# Patient Record
Sex: MALE | Race: WHITE
[De-identification: names, ages, dates, MRNs, and addresses within clinical notes are randomized per-mention and may not be internally consistent; named-entity substitution may affect disease eponyms.]

---

## 2018-01-09 ENCOUNTER — HOSPITAL ENCOUNTER (INPATIENT)
Dept: HOSPITAL 68 - ERH | Age: 68
LOS: 2 days | DRG: 177 | End: 2018-01-11
Attending: INTERNAL MEDICINE | Admitting: INTERNAL MEDICINE
Payer: COMMERCIAL

## 2018-01-09 VITALS — SYSTOLIC BLOOD PRESSURE: 132 MMHG | DIASTOLIC BLOOD PRESSURE: 84 MMHG

## 2018-01-09 VITALS — HEIGHT: 69 IN | BODY MASS INDEX: 20.14 KG/M2 | WEIGHT: 136 LBS

## 2018-01-09 DIAGNOSIS — R00.0: ICD-10-CM

## 2018-01-09 DIAGNOSIS — R13.10: ICD-10-CM

## 2018-01-09 DIAGNOSIS — J69.0: Primary | ICD-10-CM

## 2018-01-09 DIAGNOSIS — Z85.21: ICD-10-CM

## 2018-01-09 DIAGNOSIS — J96.01: ICD-10-CM

## 2018-01-09 DIAGNOSIS — I10: ICD-10-CM

## 2018-01-09 DIAGNOSIS — Z85.46: ICD-10-CM

## 2018-01-09 DIAGNOSIS — I25.10: ICD-10-CM

## 2018-01-09 LAB
ABSOLUTE GRANULOCYTE CT: 17.7 /CUMM (ref 1.4–6.5)
APTT BLD: 30 SEC (ref 25–37)
BASOPHILS # BLD: 0 /CUMM (ref 0–0.2)
BASOPHILS NFR BLD: 0 % (ref 0–2)
EOSINOPHIL # BLD: 0.4 /CUMM (ref 0–0.7)
EOSINOPHIL NFR BLD: 2.3 % (ref 0–5)
ERYTHROCYTE [DISTWIDTH] IN BLOOD BY AUTOMATED COUNT: 14.3 % (ref 11.5–14.5)
GRANULOCYTES NFR BLD: 93.4 % (ref 42.2–75.2)
HCT VFR BLD CALC: 40.2 % (ref 42–52)
LYMPHOCYTES # BLD: 0.4 /CUMM (ref 1.2–3.4)
MCH RBC QN AUTO: 27.2 PG (ref 27–31)
MCHC RBC AUTO-ENTMCNC: 32.1 G/DL (ref 33–37)
MCV RBC AUTO: 84.6 FL (ref 80–94)
MONOCYTES # BLD: 0.5 /CUMM (ref 0.1–0.6)
PLATELET # BLD: 339 /CUMM (ref 130–400)
PMV BLD AUTO: 7.9 FL (ref 7.4–10.4)
PROTHROMBIN TIME: 14.8 SEC (ref 9.4–12.5)
RED BLOOD CELL CT: 4.75 /CUMM (ref 4.7–6.1)
WBC # BLD AUTO: 19 /CUMM (ref 4.8–10.8)

## 2018-01-09 NOTE — ADMISSION CERTIFICATION
Admission Certification
 
Certification Statement
- As attending physician, I certify that at the time of
- admission, based on clinical presentation, severity of
- symptoms, need for further diagnostic testing and
- therapeutic interventions, and risk of adverse outcomes
- without in-hospital treatment, in my clinical assessment,
- this patient requires an acute hospital stay for a minimum
- of two nights or longer. I have also considered psychsocial
- factors such as support system, advanced age, financial
- issues, cognitive issues, and failed out-patient treatments,
- past re-admission history, safety of patient, and lack of
- compliance as applicable.
Specific rationale supporting this admission is:
Hospitalization is required for management of his aspiration pneumonia.  He will
require intravenous antibiotics

## 2018-01-09 NOTE — HISTORY & PHYSICAL
**See Addendum**
General Information and HPI
MD Statement:
I have seen and personally examined BOSWORTH,RAYMOND and documented this H&P.
 
The patient is a 67 year old M who presented with a patient stated chief 
complaint of [hypoxia].
 
Source of Information: patient, family
Exam Limitations: no limitations
History of Present Illness:
Mr. Bosworth is 67 year old male with past medical history significant for  
labile blood pressure, coronary artery disease with history of coronary stenosis
and right carotid stenosis, prostate cancer status post prostatectomy, throat 
and tongue cancer status post radiation and chemotherapy 2010 complicated by 
esophageal dysfunction had multiple esophageal dilatation procedure (Dr. Ramos (GI) 3 esophageal dilatations) last one July 2017 who presented to ED 
with chief complaint of hypoxia.  Patient was following with his primary care 
physician today for chronic cough when was found to have oxygen saturation in 
80s for the first time.
She reported that since 2010 he has been having multiple aspiration episodes and
dysphagia that has been getting worse in severity and frequency, in November 2017 he developed a productive cough of clear sputum that didn't improve with 
outpatient management, today patient went to his PCP for evaluation and was 
found to have hypoxia.
Patient denied any fever, chills, shortness of breath, chest pain, palpitation, 
history of nausea or vomiting, abdominal pain, diarrhea.  He does follow with 
Dr. Ackerman (ENT) for swallowing who recommended swallow evaluation at [facility] 
however patient didn't have any speech therapy evaluation yet.
He has been having dysphagia with liquids and solids, continue to have regular 
diet with no restrictions.  Had intentional weight loss of 30 pounds over the 
last 6 months associated with generalized weakness.
No history of joint pain, muscle pain, headaches, dizziness, weakness or 
numbness.
 
 
Allergies/Medications
Allergies:
Coded Allergies:
NO KNOWN ALLERGIES (02/06/15)
 
Home Med list
Midodrine HCl 2.5 MG TABLET   1 TAB PO TID BP  (Reported)
Pantoprazole Sodium 40 MG TABLET.DR   1 TAB PO DAILY GERD  (Reported)
Pravastatin Sodium 40 MG TABLET   1 TAB PO QPM CHOLESTEROL  (Reported)
 
 
Past History
 
Travel History
Traveled to [place] past 21 day No
 
Medical History
Cardiovascular: CAD, hypertension
Cancer(s): prostate cancer, THROAT CANCER
 
Surgical History
Surgical History: prostatectomy, feeding tube (2010) Bilateral heel repair Left 
knee  arthroscopic surgery Rhinoplasty
 
Review of Systems
 
Review of Systems
Constitutional:
Reports: see HPI. 
 
Exam & Diagnostic Data
Last 24 Hrs of Vital Signs/I&O
 Vital Signs
 
 
Date Time Temp Pulse Resp B/P B/P Pulse O2 O2 Flow FiO2
 
     Mean Ox Delivery Rate 
 
01/09 1500 98.0 92 18 130/68  94 Nasal 2.0L 
 
       Cannula  
 
01/09 1148    170/82     
 
01/09 1147 97.8 99 18 171/97  95 Nasal 2.0L 
 
       Cannula  
 
01/09 0955      87 Room Air  
 
01/09 0906 97.5 124 18 161/95  87 Room Air  
 
 
 Intake & Output
 
 
 01/09 1600 01/09 0800 01/09 0000
 
Intake Total 0  
 
Output Total   
 
Balance 0  
 
    
 
Intake, Oral 0  
 
Patient 61.689 kg  
 
Weight   
 
Weight Estimated  
 
Measurement   
 
Method   
 
 
 
 
Physical Exam
General Appearance Alert, Oriented X3, Cooperative, No Acute Distress
Skin No Rashes
Skin Temp/Moisture Exam: Warm/Dry
HEENT Atraumatic, PERRLA, EOMI, Mucous Membr. moist/pink
Neck Supple
Lymphatic No cervical lymphadenopathy 
Cardiovascular Regular Rate, Normal S1, Normal S2, No Murmurs
Lungs Clear to Auscultation, Normal Air Movement, Transmitted sounds 
Abdomen Normal Bowel Sounds, Soft, No Tenderness
Neurological Normal Gait, Normal Speech, Strength at 5/5 X4 Ext, Normal Tone, 
Sensation Intact, Cranial Nerves 3-12 NL, Reflexes 2+
Extremities No Clubbing, No Cyanosis, No Edema, Normal Pulses, No Tenderness/
Swelling
 
Assessment/Plan
Assessment:
Mr. Bosworth is 67 year old male with past medical history significant for  
labile blood pressure, coronary artery disease with history of coronary stenosis
and right carotid stenosis, prostate cancer status post prostatectomy, throat 
and tongue cancer status post radiation and chemotherapy 2010 complicated by 
esophageal dysfunction had multiple esophageal dilatation procedure (Dr. Ramos (GI) 3 esophageal dilatations) last one July 2017 who presented to ED 
with chief complaint of hypoxia.  
 
On admission
Vital signs temperature 97.5, heart rate 124, blood pressure 161/95, respiratory
18 and saturating 87% on room air, 95% on 2 L
Labs significant for leukocytosis with 11 bands and left shift, H&H 12.9/40.2, 
platelet 339, sodium 142, potassium 4.6, chloride 100, bicarbonate 31, BUN 26 
and creatinine 1.1, lactic acid 1.9, troponin less than 0.01, albumin 3.1
 
CTA 
1. No pulmonary embolism.
2. Findings are consistent with infectious bronchiolitis, bronchopneumonia
and associated hilar and mediastinal lymphadenopathy. Query whether patient
is clinically at risk for aspiration.
3. Trace right pleural effusion.
 
EKG sinus tachycardia, heart rate 122, T-wave inversion in V2
 
Modified barium swallow in May 2017
IMPRESSION:
 
1. Pooling of contrast within the valleculae with puree, nectar and bread
consistencies.
2. Penetration of contrast seen with thin liquids and with honey consistency
without josh aspiration. Given this finding, it was not felt to be safe to
perform a barium swallow as requested on the requisition.
 
X-ray barium swallow was for angiogram on 11/30/15
IMPRESSION:
 
1. Josh aspiration into the proximal trachea observed on a single occasion
upon successive swallowing of thin liquids.
2. Mild cricopharyngeal hypertrophy, causing esophageal luminal narrowing,
preventing passage of a barium tablet.
3. Ingested barium tablet temporarily also lodges in the vallecula.
4. No significant esophageal dysmotility.
 
 
Problem list
#Aspiration pneumonia/pneumonitis in sitting of chronic dysphagia as a result of
dysphagia stricture
#Acute hypoxic respiratory failure
#Sinus tachycardia could be stress induced versus hypoxia
#Hypertension
#History of coronary artery disease
 
Plan
-Admit to telemetry floor given hypoxia, sinus tachycardia and labile blood 
pressure
-Continue oxygen supplementation and TRC
-Start Unasyn 3 g every 6 IV
-Keep patient nothing by mouth pending swallow evaluation
-Obtain Pulmonary consultation
-Vital signs every shift
-Panculture
-Protonix 40 daily
-Robitussin with codeine every afternoon
-Continue home medication admitted drawn 2.5 mg 3 times a day and pravastatin 40
mg daily
-DVT prophylaxis Lovenox
-Diet nothing by mouth pending swallow eval
-Code full code
 
As Ranked By This Provider
Problem List:
 1. Pneumonia
 
 
Core Measures/Misc (9/17)
 
Acute Coronary Syndrome
ACS Diagnosis: No
 
Congestive Heart Failure
Congestive Heart Failure Diagnosis No
 
Cerebrovascular Accident
CVA/TIA Diagnosis: No
 
VTE (View Protocol)
VTE Risk Factors Age>40
No Mechanical VTE Prophylaxis d/t N/A MechProphylax Ordered
No VTE Pharm Prophylaxis d/t NA PharmProphylax ordered
 
Sepsis (View protocol)
Sepsis Present: No

## 2018-01-09 NOTE — CT SCAN REPORT
EXAMINATION:
CT ANGIOGRAM OF THE CHEST WITH CONTRAST (CT PULMONARY ANGIOGRAM FOR PE)
 
CLINICAL INFORMATION:
Intermittent hypoxia and tachycardia. Evaluate for pulmonary embolism.
 
COMPARISON:
Chest CT from 04/15/2015. CXR from 11/16/2017.
 
TECHNIQUE:
Prior to contrast administration, noncontrast localization images were
obtained. Subsequently, multidetector volumetric imaging was performed from
the thoracic inlet to below the diaphragms following the administration of 95
mL Optiray 350 intravenous contrast. No contrast reaction reported. Sagittal,
coronal, and MIP oblique sagittal reformatted images were obtained on the CT
workstation, uploaded to PACS, and reviewed.
 
Total exam dose-length product 255 mGy-cm
 
FINDINGS:
 
QUALITY OF STUDY/CONTRAST BOLUS: Satisfactory.
 
PULMONARY ARTERIES: Pulmonary arteries are normal in caliber. No embolic
filling defects within the main, lobar or segmental vessels.
 
THORACIC AORTA: There is mild atherosclerotic calcification of the thoracic
aorta without aneurysm or dissection.
 
LUNGS AND PLEURA: Trachea and mainstem bronchi are widely patent and normal
in caliber. There are scattered endobronchial secretions within lower lobe
bronchi. There are numerable tree-in-bud nodules in both multiple lobes of
both lungs, most severely affecting the lower lobes, associated with
scattered patchy ground glass opacities. There are scattered regions of
consolidation within the right middle lobe, inferior lingula and lower lobes.
A 0.7 cm cystic space or pneumatocele is present within the right upper lobe
(image 167, series 2). Trace right pleural effusion. No pneumothorax.
 
MEDIASTINUM: The heart size is normal. Mild atherosclerotic calcification of
left anterior descending coronary artery. No evidence of septal bowing or
right heart strain. The esophagus is unremarkable. The visualized portion of
the thyroid gland is normal.
 
LYMPHATICS: No axillary lymphadenopathy. The right and left hilar lymph nodes
measure up to 1.1 cm short axis dimension. Largest lymph nodes within the
prevascular space of the mediastinum measure up to 0.9 cm short axis
dimension. Subcarinal lymphadenopathy is present and the largest lymph node
is 2.3 cm AP.
 
UPPER ABDOMEN: Unremarkable.  No reflux of contrast into the hepatic veins to
suggest elevated right heart pressures.
 
OSSEOUS STRUCTURES: Old fractures of left lateral sixth and seventh ribs.
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 No suspicious
osseous lesions.
 
IMPRESSION:
 
1. No pulmonary embolism.
2. Findings are consistent with infectious bronchiolitis, bronchopneumonia
and associated hilar and mediastinal lymphadenopathy. Query whether patient
is clinically at risk for aspiration.
3. Trace right pleural effusion.

## 2018-01-09 NOTE — ED GENERAL ADULT
History of Present Illness
 
General
Chief Complaint: General Adult
Stated Complaint: LOW 02 -SOB
Source: patient, family
Exam Limitations: no limitations
Allergies
Coded Allergies:
NO KNOWN ALLERGIES (02/06/15)
 
Reconcile Medications
Amoxicillin/Potassium Clav (Augmentin 875-125 Tablet) 875 MG-125 MG TABLET   1 
TAB PO BID PNEUMONIA 
     .
Hydralazine HCl 25 MG TABLET   1 TAB PO TID HIGH BLOOD PRESSURE
     .
Metoprolol Tartrate 25 MG TABLET   1 TAB PO BID HIGH BLOOD PRESSURE
     .
Midodrine HCl 2.5 MG TABLET   1 TAB PO TID BP  (Reported)
Pantoprazole Sodium 40 MG TABLET.DR   1 TAB PO DAILY GERD  (Reported)
Pravastatin Sodium 40 MG TABLET   1 TAB PO QPM CHOLESTEROL  (Reported)
 
Triage Note:
PT TO ER SENT IN BY DR. LEDBETTER FOR EVAL OF
 HYPOXIA. PT HAS 2 YEAR HX OF COUGH/SOB, WORSENING
 X 3 WEEKS. WAS DX WITH BRONCHITIS, GIVEN ABX WITH
 RELIEF. HOWEVER, SYMPTOMS CONTINUE. PT HAS HX OF
 THROAT CA, PER PT, HAS BEEN TOLD HE IS ASPIRATING
 WHEN COUGHING. TRYING TO GET APPT WITH [facility] SPEECH
 AND SWALLOW PATHOLOGIST.
Triage Nurses Notes Reviewed? yes
Duration: constant
HPI:
Mr Bosworth is a 67-year-old gentleman with a PMH of labile BP currently on 
midodrine 2.5 mg TID with plans to follow-up with his cardiologist (Dr. Dukes) 
at [facility] on 1/10/18 to start additional BP lowering agent, CAD on pravastatin 40 
mg and aspirin 81 mg daily, prostate CA s/p prostatectomy, throat/tongue cancer 
diagnosed 2010 s/p radiation and chemotherapy, complicated with esophageal 
dysfunction for which he has been followed up with by Dr. Ackerman (ENT) and Dr. Ramos (GI) with 3 esophageal dilatations, most recently as July 2017 with 
findings for esophageal web and proximal esophageal narrowing.
 
Unfortunately due to these complications he has long-standing dysphagia to 
solids and liquids, chronic cough which over the past 9 months has become 
productive with clear sputum, approximately 30 pound weight loss and generalized
weakness over the past 6 months.
 
He was diagnosed with bronchitis in November 2017, started on a Z-Arash with no 
improvement in symptoms, followed up with his PCP and subsequently diagnosed 
with aspiration pneumonia, completed a ten-day course of ciprofloxacin 500 mg 
BID + metronidazole 500 mg TID. He did experience significant improvement in his
symptoms (decreased cough, increased energy) for a few days after completing 
this antibiotics.
 
He was referred to the ED for evaluation after his saturations were noted to be 
in the 80's at the PCPs office today. 
 
He denies any headaches, blurry vision, night sweats, chills, body aches, 
abdominal pain, diarrhea, changes in urinary function, palpitations, lower 
extremity swelling.
 
Of note, according to the patient/his wife, previous antihypertensive 
medications including amlodipine, Bystolic and Edarbi were discontinued in the 
context of labile blood pressure/autonomic dysregulation with subsequent 
hypotension and he is scheduled to follow-up with his cardiologist Dr. Miller on 
1/10/18. 
(Krystian Campos MD)
 
Vital Signs & Intake/Output
Vital Signs & Intake/Output
 Vital Signs
 
 
Date Time Temp Pulse Resp B/P B/P Pulse O2 O2 Flow FiO2
 
     Mean Ox Delivery Rate 
 
01/09 1148    170/82     
 
01/09 1147 97.8 99 18 171/97  95 Nasal 2.0L 
 
       Cannula  
 
01/09 0955      87 Room Air  
 
01/09 0906 97.5 124 18 161/95  87 Room Air  
 
 
 
(Ham DOWNING,Twyla)
 
Past History
 
Travel History
Traveled to [place] past 21 day No
 
Medical History
Any Pertinent Medical History? see below for history
Cardiovascular: CAD, hypertension
Cancer(s): prostate cancer, THROAT CANCER
 
Surgical History
Surgical History: prostatectomy, feeding tube (2010) Bilateral heel repair Left 
knee  arthroscopic surgery Rhinoplasty
 
Psychosocial History
What is your primary language English
Tobacco Use: Quit >30 days ago
 
Family History
Hx Contributory? No
(Krystian Campos MD)
 
Review of Systems
 
Review of Systems
Constitutional:
Reports: see HPI. 
EENTM:
Reports: see HPI. 
Respiratory:
Reports: see HPI. 
Cardiovascular:
Reports: no symptoms. 
GI:
Reports: no symptoms. 
Genitourinary:
Reports: no symptoms. 
Musculoskeletal:
Reports: see HPI. 
Skin:
Reports: see HPI. 
Neurological/Psychological:
Reports: see HPI. 
(Krystian Campos MD)
 
Physical Exam
 
Physical Exam
General Appearance: cachectic appearing, chronic productive cough
Head: normal appearance
Eyes:
Bilateral: EOMI. 
Ears, Nose, Throat: mucous membranes are dry.
Neck: cachectic appearing. Evidence of skin changes on the right side of the 
neck
Respiratory: SLight expiratory rhonchi present 
Cardiovascular: regular rate/rhythm, Tachycardic
Gastrointestinal: normal bowel sounds, soft, non-tender
Extremities: normal inspection, normal range of motion, no edema
Neurologic/Psych: awake, alert
 
Core Measures
ACS in differential dx? No
CVA/TIA Diagnosis: No
Sepsis Present: No
Sepsis Focused Exam Completed? No
(Andres DOWNING,Krystian)
 
Progress
Differential Diagnoses
I considered the following diagnoses in my evaluation of the patient: 
 
Initial ED EKG: none
(Andres DOWNING,Krystian)
Plan of Care:
 Orders
 
 
Procedure Date/time Status
 
CBC WITHOUT DIFFERENTIAL 01/10 0600 Active
 
BASIC ELECTROLYTES PLUS BUN&CR 01/10 0600 Active
 
Nothing by Mouth 01/09 D Active
 
SWALLOW EVALUATION 01/09 1415 Active
 
TRC EVALUATION (GEN) 01/09 1415 Active
 
Pathway - chart 01/09 1415 Active
 
House Staff 01/09 1415 Active
 
Patient Data 01/09 1415 Active
 
Code Status 01/09 1415 Active
 
Patient Data 01/09 1214 Active
 
ED Holding Orders 01/09 1206 Active
 
Admit to inpatient 01/09 1206 Active
 
Vital Signs 01/09 1206 Active
 
Code Status 01/09 1206 Complete
 
TRC EVALUATION (GEN) 01/09 1122 Active
 
CULTURE,URINE 01/09 1122 Active
 
LOWER RESPIRATORY CULTURE 01/09 1122 Active
 
URINALYSIS 01/09 1122 Complete
 
Add-on Test (ER Only) 01/09 1042 Active
 
Add-on Test (ER Only) 01/09 0943 Active
 
D-DIMER 01/09 0937 Complete
 
Intake & Output 01/09 0931 Active
 
BLOOD CULTURE 01/09 0923 Active
 
TROPONIN LEVEL 01/09 0923 Complete
 
PARTIAL THROMBOPLASTIN TIME 01/09 0923 Complete
 
PROTHROMBIN TIME 01/09 0923 Complete
 
LACTIC ACID 01/09 0923 Complete
 
COMPREHENSIVE METABOLIC PANEL 01/09 0923 Complete
 
CBC WITHOUT DIFFERENTIAL 01/09 0923 Complete
 
EKG 01/09 0923 Active
 
VTE Mechanical Prophylaxis 01/09  UNK Active
 
Vital Signs 01/09  UNK Active
 
 
 Current Medications
 
 
  Sig/Nancy Start time  Last
 
Medication Dose  Stop Time Status Admin
 
Ceftriaxone Sodium 1,000 MG DAILY 01/10 1000 AC 
 
(Rocephin)     
 
Enoxaparin Sodium 40 MG DAILY 01/09 1415 AC 
 
(Lovenox)     
 
Acetaminophen 650 MG Q6P PRN 01/09 1400 AC 
 
(Tylenol)     
 
Azithromycin 500 MG DAILY 01/09 1224 AC 
 
(Zithromax)     
 
Dextrose/Water 250 ML    
 
(D5W)     
 
Sodium Chloride 1,000 ML Q20H 01/09 1045 AC 01/09
 
(Normal Saline 0.9%)   01/10 0644  1109
 
 
 Laboratory Tests
 
 
 
01/09/18 1352:
Urine Color YEL, Urine Clarity CLEAR, Urine pH 6.0, Ur Specific Gravity 1.015, 
Urine Protein TRACE  H, Urine Ketones NEG, Urine Nitrite NEG, Urine Bilirubin 
NEG, Urine Urobilinogen 0.2, Ur Leukocyte Esterase NEG, Ur Microscopic SEDIMENT 
EXAMINED, Urine RBC 1-3, Urine WBC 1-3  H, Ur Epithelial Cells RARE, Hyaline 
Casts 1-3  H, Urine Mucus FEW, Urine Hemoglobin NEG, Urine Glucose NEG
 
01/09/18 1223:
Lactic Acid Cancelled
 
01/09/18 0942:
D-Dimer High Sensitivty Cancelled
 
01/09/18 0937:
Anion Gap 11, Estimated GFR > 60, BUN/Creatinine Ratio 23.6, Glucose 146  H, 
Lactic Acid 1.9, Calcium 9.6, Total Bilirubin 1.1, AST 21, ALT 26, Alkaline 
Phosphatase 95, Troponin I < 0.01, Total Protein 6.7, Albumin 3.1  L, Globulin 
3.6, Albumin/Globulin Ratio 0.9  L, PT 14.8  H, INR 1.41  H, APTT 30, D-Dimer 
High Sensitivty 882  H, CBC w Diff MAN DIFF ORDERED, RBC 4.75, MCV 84.6, MCH 
27.2, RDW 14.3, MPV 7.9, Gran % 93.4  H, Lymphocytes % 1.9  L, Monocytes % 2.4, 
Eosinophils % 2.3, Basophils % 0, Absolute Granulocytes 17.7  H, Segmented 
Neutrophils 81  H, Band Neutrophils 11  H, Absolute Lymphocytes 0.4  L, 
Lymphocytes 4  L, Monocytes 2, Absolute Monocytes 0.5, Eosinophils 2, Absolute 
Eosinophils 0.4, Absolute Basophils 0, Platelet Estimate ADEQUATE, Normocytic 
RBCs VERIFIED, Normochromic RBCs VERIFIED, PUBS MCHC 32.1  L
 Microbiology
01/09 1352  URINE ROUT: Urine Culture - RECD
01/09 1122  LOWER RESP: Respiratory Culture - ORD
01/09 1122  LOWER RESP: Gram Stain - ORD
01/09 1012  BLOOD: Blood Culture - RECD
01/09 0945  BLOOD: Blood Culture - RECD
 
 
(Ham DOWNING,Twyla)
 
Departure
 
Departure
Time of Disposition: 1330
Condition: Stable
Clinical Impression
Primary Impression: Pneumonia
Referrals:
Hannah Ledbetter MD (PCP/Family)
 
Additional Instructions:
You're being admitted for treatment of bronchopneumonia/bronchiolitis that will 
require IV antibiotics.
You will also require close monitoring of your blood pressure while admitted.
 
Departure Forms:
Customer Survey
General Discharge Information
Prescriptions:
Current Visit Scripts
Amoxicillin/Potassium Clav (Augmentin 875-125 Tablet) 1 TAB PO BID  
     #15 TAB 
     .
 
Hydralazine HCl 1 TAB PO TID  
     #90 TAB 
     .
 
Metoprolol Tartrate 1 TAB PO BID  
     #60 TAB 
     .
 
 
 
Admission Note
Documentation of Exam:
Documentation of any treatments & extenuating circumstances including Concerns 
Regarding Discharge (functional status, medication knowledge or non-compliance, 
living conditions, etc.) that warrant an admission rather than observation: [
Bronchopneumonia/bronchiolitis, systemic inflammatory response syndrome].  
Patient will require supplemental oxygen, IV antibiotics, respiratory treatments
, possible pulmonology/infectious disease consults
 
(Krystian Campos MD)
 
Departure
Time of Disposition: 1206
Disposition: STILL A PATIENT
 
Admission Note
Spoke With:
Korey Oseguera MD
 
Resident Co-Sign Statement
Statement:
ED Attending supervision documentation-
 
[X] I saw and evaluated the patient. I have also reviewed all the pertinent lab 
results and diagnostic results. I agree with the findings and the plan of care 
as documented in the Resident's documentation. 
 
[X] I have reviewed the ED Record and agree with the Resident's documentation.
 
[] Additions or exceptions (if any) to the Resident's note and plan are 
summarized below:
[]
 
(Ham DOWNING,Twyla)
 
Critical Care Note
 
Critical Care Note
Critical Care Time: non-applicable
(Krystian Campos MD)

## 2018-01-10 VITALS — SYSTOLIC BLOOD PRESSURE: 168 MMHG | DIASTOLIC BLOOD PRESSURE: 88 MMHG

## 2018-01-10 VITALS — SYSTOLIC BLOOD PRESSURE: 190 MMHG | DIASTOLIC BLOOD PRESSURE: 102 MMHG

## 2018-01-10 VITALS — SYSTOLIC BLOOD PRESSURE: 120 MMHG | DIASTOLIC BLOOD PRESSURE: 78 MMHG

## 2018-01-10 VITALS — DIASTOLIC BLOOD PRESSURE: 80 MMHG | SYSTOLIC BLOOD PRESSURE: 142 MMHG

## 2018-01-10 VITALS — SYSTOLIC BLOOD PRESSURE: 172 MMHG | DIASTOLIC BLOOD PRESSURE: 88 MMHG

## 2018-01-10 LAB
ABSOLUTE GRANULOCYTE CT: 9.3 /CUMM (ref 1.4–6.5)
BASOPHILS # BLD: 0 /CUMM (ref 0–0.2)
BASOPHILS NFR BLD: 0.1 % (ref 0–2)
EOSINOPHIL # BLD: 1 /CUMM (ref 0–0.7)
EOSINOPHIL NFR BLD: 8.9 % (ref 0–5)
ERYTHROCYTE [DISTWIDTH] IN BLOOD BY AUTOMATED COUNT: 14.5 % (ref 11.5–14.5)
GRANULOCYTES NFR BLD: 82.6 % (ref 42.2–75.2)
HCT VFR BLD CALC: 35.6 % (ref 42–52)
LYMPHOCYTES # BLD: 0.5 /CUMM (ref 1.2–3.4)
MCH RBC QN AUTO: 27.7 PG (ref 27–31)
MCHC RBC AUTO-ENTMCNC: 32.8 G/DL (ref 33–37)
MCV RBC AUTO: 84.5 FL (ref 80–94)
MONOCYTES # BLD: 0.4 /CUMM (ref 0.1–0.6)
PLATELET # BLD: 287 /CUMM (ref 130–400)
PMV BLD AUTO: 8.8 FL (ref 7.4–10.4)
RED BLOOD CELL CT: 4.21 /CUMM (ref 4.7–6.1)
WBC # BLD AUTO: 11.2 /CUMM (ref 4.8–10.8)

## 2018-01-10 NOTE — ECHOCARDIOGRAM REPORT
BOSWORTH, RAYMOND 
 
 Age:    67     :    1950      Gender:     M 
 
 MRN:    [id number] 
 
 Exam Date:     01/10/2018  
                11:04 
 
 Exam Location: 1  
 [place] 
 
 Ht (in):     69      Wt (lb):      136     BSA:    1.73 
 
 BP:          172     /     88 
 
 Ordering Physician:        Anders Tom MD 
 
 Referring Physician:       Anders Tom MD 
 
 Technologist:              Patrick Velázquez [facility] 
 
 Room Number:               185-1 
 
 Indications:       Shortness of breath 
 
 Rhythm:                 Sinus 
 
 Technical Quality:      Good 
 
 FINDINGS 
 
 Left Ventricle 
 Normal left ventricular size, wall thickness and systolic function  
 with no obvious regional wall motion abnormalities.    The ejection  
 fraction is visually estimated at  >65   %.  Abnormal relaxation  
 filling pattern of the left ventricle for age (stage 1 diastolic  
 dysfunction). 
 
 Right Ventricle 
 The right ventricle is normal in size and function. 
 
 Right Atrium 
 The right atrium is normal in size. 
 
 Left Atrium 
 The left atrium is normal in size.  The interatrial septum is  
 intact. 
 
 Mitral Valve 
 The mitral valve is normal in structure and function.  There is no  
 mitral regurgitation. 
 
 Aortic Valve 
 Focal thickening of the aortic valve cusps. No aortic stenosis. No  
 aortic regurgitation. 
 
 Tricuspid Valve 
 The tricuspid valve is normal in structure and function.  There is  
 trace tricuspid regurgitation.  Unable to estimate the right  
 ventricular systolic pressure. 
 
 Pulmonic Valve 
 Structurally normal pulmonic valve.  There is no pulmonic  
 regurgitation. 
 
 Pericardium 
 Normal pericardium without effusion.  No pleural effusion. 
 
 Great Vessels 
 Normal aortic root dimension.  The aortic arch and great vessels are  
 well seen and are normal. 
 
 CONCLUSIONS 
 Normal left ventricular size, wall thickness and systolic function  
 with no obvious regional wall motion abnormalities. 
 The ejection fraction is visually estimated at  >65   %. 
 Abnormal relaxation filling pattern of the left ventricle for age  
 (stage 1 diastolic dysfunction). 
 The left atrium is normal in size. 
 The mitral valve is normal in structure and function. 
 Focal thickening of the aortic valve cusps. 
 No aortic stenosis. 
 No aortic regurgitation. 
 Unable to estimate the right ventricular systolic pressure. 
 
 Javier Ramos M.D. 
 (Electronically Signed) 
 Final Date:      10 January 2018  
                  13:11 
 
 MEASUREMENTS  (Male / Female) Normal Values 
 
 2D ECHO 
 LV Diastolic Diameter PLAX        4.3 cm                4.2 - 5.9 / 3.9 - 5.3 
cm 
 LV Systolic Diameter PLAX         2.5 cm                2.1 - 4.0 cm 
 LV Fractional Shortening PLAX     41.9 %                25 - 46  % 
 LV Ejection Fraction 2D Teich     73.1 %                 
 IVS Diastolic Thickness           1.1 cm                 
 LVPW Diastolic Thickness          1.0 cm                 
 LV Relative Wall Thickness        0.5                    
 LVOT Diameter                     2.0 cm                 
 Aortic Root Diameter              3.1 cm                 
 LA Systolic Diameter LX           3.5 cm                3.0 - 4.0 / 2.7 - 3.8 
cm 
 Ascending Aorta Diameter          2.9 cm                 
 
 DOPPLER 
 AV Peak Velocity                  130.0 cm/s             
 AV Peak Gradient                  6.8 mmHg               
 AV Mean Velocity                  92.0 cm/s              
 AV Mean Gradient                  4.0 mmHg               
 AV Velocity Time Integral         28.0 cm                
 LVOT Peak Velocity                98.5 cm/s              
 LVOT Peak Gradient                3.9 mmHg               
 LVOT Mean Velocity                58.4 cm/s              
 LVOT Mean Gradient                2.0 mmHg               
 LVOT Velocity Time Integral       22.0 cm                
 LVOT Stroke Volume                69.1 cm               
 AV Area Cont Eq vti               2.5 cm                
 AV Area Cont Eq pk                2.4 cm                
 MV Peak Velocity                  113.0 cm/s             
 MV Peak Gradient                  5.1 mmHg               
 MV Mean Velocity                  70.6 cm/s              
 MV Mean Gradient                  2.0 mmHg               
 Mitral E Point Velocity           63.7 cm/s              
 Mitral A Point Velocity           102.0 cm/s             
 Mitral E to A Ratio               0.6                    
 MV PHT Velocity                   84.2 cm/s              
 MV Deceleration Frio             569.0 cm/s            
 MV Pressure Half Time             44.4 ms                
 MV Area PHT                       5.0 cm                
 MV Deceleration Time              475.0 ms               
 PV Peak Velocity                  113.0 cm/s             
 PV Peak Gradient                  5.1 mmHg               
 PV Mean Velocity                  75.4 cm/s              
 PV Mean Gradient                  3.0 mmHg               
 PV Velocity Time Integral         22.5 cm                
 LV E' Lateral Velocity            10.6 cm/s              
 Mitral E to LV E' Lateral Ratio   6.0                    
 LV E' Septal Velocity             11.7 cm/s              
 Mitral E to LV E' Septal Ratio    5.4

## 2018-01-10 NOTE — CONS- GASTROENTEROLOGY
General Information and HPI
 
Consulting Request
Date of Consult: 01/10/18
Requested By:
Evelyne DOWNING,Priti
 
Reason for Consult:
I was called by the hospitalist service earlier today in GI coverage for Dr. RICARDO Ramos, to assess patient with known laryngeal/tongue Ca for aspiration & 
dysphagia. The GI consult was done in the presence of the patient's wife, Isabella, 
at the bedside, as per patient request.
Source of Information: patient, old records
Exam Limitations: no limitations
History of Present Illness:
68 y/o male, HTN (occasionally labile, requiring Midodrine), non-DM, HLD, ASHD 
with coronary stenosis & right carotud stenosis, prostate Ca post prostatectomy,
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 
dysfunction with multiple esophageal dilatations per Dr. ATUL Ramos, last done 
07/05/17, with dilatation of upper esophageal web. The patient claimed his 
swallowing issues did not improve a lot after his last dilitation. *The patient 
remotely had a PEG from 10/2010-04/2011. 03/26/13: Last PET- no gross 
recurrence. 10/08/15: Normal PFTs. 
 
The patient presented to the [facility] 01/09/18 at 8:59 p.m. with worsening of 
chronic cough and hypoxia, at the recommendation of his PMD, with O2 sat 80's 
RA. Upon arrival, /95, P 124, R 18, T 97.5, O2 sat 87%. His O2 sat later 
improved to 95% on 2L.
 
Since 2010, the patient had multiple aspiration episodes and dysphagia, becoming
more frequent and severe.  In 11/2017, he developed a productive cough of clear 
sputum that didn't improve with with outpatient management with antibiotics.  He
denied any fevers, chills, shortness of breath, chest pain, palpitation, nausea,
vomiting, odynophagia, hemetemesis, melena, early satiety, or abdominal pain.  
He saw Dr. Ackerman of ENT, who recommended a swallowing evaluation at [facility], 
which was not yet done.  The patient's most recent outpatient diet predominantly
consisted of oatmeal and yogurt.  He had dysphagia with liquids and solids, but 
tolerated pills.  He had unintentionally lost 30 pounds over the past 6 months 
PTA, since 06/2017, attributed to altering his diet so that he not have 
swallowing difficulty.  He denied any fevers, chills, rashes, hemoptysis, 
pleuritic pain, or jaundice.  As the sticking was mostly near the upper 
esophageal inlet by description, there was a component of transfer dysphagia.  
Apparently, a recent outpatient ENT evaluation failed to reveal any local tumor 
recurrence.  There is no known history of thyroid disease or CVA. There was no 
nasal regurgitation of food or liquids.  There was no change in his voice.  He 
denied any diarrhea, constipation, obstipation, tenesmus, or bleeding.  
 
He was rxd Robitussin, Omeprazole 40 mg daily, nebulizers, and IV Unasyn 3g Q6h,
with improvement of leukocytosis & O2 sats. *The patient stated he just 
tolerated ground chicken and broccoli for dinner uneventfully, although he still
had a cough.
 
*The patient has had multiple endoscopic procedures by Dr. ATUL Ramos.  He has a
history of colon adenoma and positive family history of colon polyps.  There is 
no family history of any additional GI or liver issues.
 
12/09/15: Last colonoscopy to [facility]- enlarged internal hemorrhoids, left-sided 
diverticula, 2 subcm polyps removed- both benign TVA, without invasive Ca.
 
07/05/17: EGD with esophageal dilitation for dysphagia, post head & neck Ca- 
normal hypopharynx, proximal esophageal web, with slight narrowing of the 
esophageal lumen, beyond which, the caliber, contour, and mucosa of the 
esophagus was normal, the junction of 40 cm, normal stomach, normal duodenum to 
the sweep. *The area of the esophageal inlet and proximal esophagus was dilated 
for 1 minute using a 19 mm CRE balloon, with disruption of the lab. 
 
01/09/17: Admission labs- WBC 19 (81S/11B/4L/2M/2E), H/H 12.9/40.2, MCV 84.6, 
RDW 14.3, , PT 14.8, INR 1.41, PTT 30, glucose 146, BUN/Cr 26/1.1, GFR > 
60, Na 142, K 4.6, HCO3 31, AG 11, nl lactate 1.9, Ca 9.6, albumin 3.1, globulin
3.6, TBil 1.1, alk phos 95, AST 21, ALT 26, troponin < 0.01 (x 3), *elevated d-
Dimer 882; U/A- clear, yellow, 1.015. 6.0, 1-3 RBC, 1-3 WBC, 1-3 hyaline casts, 
tr protein; neg nitrite, neg esterase.
 
01/09/18: BC x 2- negative so far.
01/09/18: UC- negative x 1 day.
01/10/18: Sputum gram stain- many WBC, few squamous cells, few GPC, rare GNR & 
GN cocci, mod GPR, mod budding yeast; Sputum C&S- pending.
 
01/10/18: WBC 11.2, H/H 11.7/35.6, , BUN/Cr 17/0.8, GFR > 60, essentially
normal electrolytes, mildly elevated TSH 4.24, normal FT4 1.41
 
01/09/18: CTA CHEST WITH CONTRAST (CT PULMONARY ANGIOGRAM FOR PE)-
1. No pulmonary embolism.
2. Findings are consistent with infectious bronchiolitis, bronchopneumonia B/L, 
R > L,
and associated hilar and mediastinal lymphadenopathy. Query whether patient
is clinically at risk for aspiration.
3. Trace right pleural effusion.
4. Old fractures left lateral 6th & 7th ribs.
 
01/10/18: EKG- NSR @ 84, normal axis, normal intervals, occasional unifocal PVCs
, q lat.
 
01/10/18:  XRY-MODIFIED BARIUM SWALLOW-
Laryngeal penetration as well as aspiration were noted. Please see the speech 
pathology report 
for details (*per speech pathology, the patient had decreased pharyngeal 
constriction and laryngeal elevation, resulting in moderate residual in the 
valleculae and along the posterior pharyngeal wall.  He was able to clear the 
majority of the residual with multiple spontaneous dry swallows and cough/
swallow.  He had transient penetration with honey and nectar thick liquids and 
thin liquids to the level of the vocal folds, which cleared upon completion of 
swallow.  He had aspiration of thin liquids via straw, score 8/8 on penetration/
aspiration scale. **They advised pharyngeal/laryngeal strengthening exercises, &
recommended ground diet, nectar liquids, and liquids via spoon, with multiple 
swallows at a reduced rate at 1 teaspoon at a time).
 
01/10/18: ECHOCARDIOGRAM-
Normal left ventricular size, wall thickness and systolic function  
 with no obvious regional wall motion abnormalities. 
 The ejection fraction is visually estimated at  >65   %. 
 Abnormal relaxation filling pattern of the left ventricle for age  
 (stage 1 diastolic dysfunction). 
 The left atrium is normal in size. 
 The mitral valve is normal in structure and function. 
 Focal thickening of the aortic valve cusps. 
 No aortic stenosis. 
 No aortic regurgitation. 
 Unable to estimate the right ventricular systolic pressure. 
- Javier Ramos M.D. 
 
Allergies/Medications
Allergies:
Coded Allergies:
NO KNOWN ALLERGIES (02/06/15)
 
Home Med List:
Midodrine HCl 2.5 MG TABLET   1 TAB PO TID BP  (Reported)
Pantoprazole Sodium 40 MG TABLET.DR   1 TAB PO DAILY GERD  (Reported)
Pravastatin Sodium 40 MG TABLET   1 TAB PO QPM CHOLESTEROL  (Reported)
 
Current Medications:
 Current Medications
 
 
  Sig/Nancy Start time  Last
 
Medication Dose Route Stop Time Status Admin
 
Acetaminophen 650 MG Q6P PRN 01/09 1400 AC 
 
  PO   
 
Albuterol Sulfate 3 ML BID 01/10 1135 AC 01/10
 
  INH   1135
 
Amlodipine Besylate 10 MG DAILY 01/10 1137 DC 
 
  PO   
 
Ampicillin Sodium/ 3,000 MG Q6H 01/10 0300 AC 01/10
 
Sulbactam Sodium  IV   1614
 
Sodium Chloride 100 ML    
 
Ampicillin Sodium/ 3,000 MG Q6 01/09 1800 DC 01/09
 
Sulbactam Sodium  IV   2100
 
Sodium Chloride 100 ML    
 
Dextrose/Water 1,000 ML Q13H 01/10 1100 AC 01/10
 
  IV 01/10 2359  1247
 
Enoxaparin Sodium 40 MG DAILY 01/09 1415 AC 01/10
 
  SC   1241
 
Guaifenesin/Codeine  10 ML QPM PRN 01/09 1530 AC 
 
Phosphate  PO   
 
Hydralazine HCl 50 MG TID 01/10 1600 AC 01/10
 
  PO   1615
 
Midodrine 2.5 MG TID 01/09 1600 AC 01/10
 
  PO   1615
 
Omeprazole 40 MG DAILY AC 01/10 0700 AC 
 
  PO   
 
Pravastatin Sodium 40 MG 1700 01/09 1700 AC 01/10
 
  PO   1616
 
Sodium Chloride 1,000 ML ONCE ONE 01/09 1730 DC 01/09
 
  IV 01/10 0649  2133
 
Sodium Chloride 1,000 ML Q20H 01/09 1045 DC 01/09
 
  IV 01/10 0644  1109
 
 
 
 
Past History
 
Travel History
Traveled to [place] past 21 day No
 
Medical History
Blood Transfusion Hx: No
Neurological: NONE
EENT: laryngeal & throat Ca, HPV +
Cardiovascular: CAD, hypertension, hyperlipidemia
Respiratory: chronic cough
Gastrointestinal: dysphagia, upper esophageal web, colon TVA diverticulosis coli
Hepatic: NONE
Renal: NONE
Musculoskeletal: NONE
Psychiatric: NONE
Endocrine: NONE
Blood Disorders: NONE
Cancer(s): prostate cancer, THROAT/TONGUE CANCER
GYN/Reproductive: NONE
 
Surgical History
Surgical History: prostatectomy, feeding tube (2010) Bilateral heel repair Left 
knee  arthroscopic surgery Rhinoplasty
 
Family History
Relations & Conditions If Any:
FATHER (prostate Ca/Paget's).
BROTHER (prostate Ca).
BROTHER (prostate Ca).
SISTER (Hodgkin's lymphoma).
 
 
Psychosocial History
Where Do You Live? Home
Who Do You Live With? spouse
Services at Home: None
Primary Language: English
Smoking Status: Former Smoker
ETOH Use: occasional use
Illicit Drug Use: denies illicit drug use
Living Will? yes
Power of /HCP? yes
Name of POA/HCP: pt's wife, Lisa Bosworth
Other Social History:
. 4 children ((3 sons & 1 dtr)- [employer].  Ex-7 pk yr cigarette smoker, D/C 
1960's. Mild EtOH. No illicit drugs. Retired .
 
Functional Ability
ADLs
Independent: dressing, eating, toileting, bathing. 
Ambulation: independent
IADLs
Independent: shopping, housework, finances, food prep, telephone, transportation
, medication admin. 
 
Employment History
Employment: Retired
Profession/Employer: retired 
 
ECHO Results (as available)
Date of last Echo 01/10/18
EF% 65
 
Review of Systems
Review of Systems:
Full 14 point review of systems otherwise noncontributory, and as above.
 
Review of Systems
Constitutional:
Reports: malaise, unexplained weight loss.  Denies: chills, diaphoresis, fever, 
weakness. 
EENTM:
Denies: blurred vision, double vision, visual changes, eye pain, eye drainage, 
eye tearing, icterus, ear discharge, ear pain, ear redness, hearing changes, 
nasal congestion, epistaxis, nasal pain, throat pain, throat swelling, mouth 
pain, tooth pain. 
Cardiovascular:
Denies: chest pain, edema, orthopena, palpitations, peripheral edema, syncope. 
Respiratory:
Reports: cough, sputum production (clear).  Denies: hemoptysis, orthopnea, short
of breath, stridor, wheezing. 
GI:
Denies: no symptoms (dysphagia), abdominal pain, bloating, constipation, 
diarrhea, distention, bowel incontinence, melena, nausea, bloody stool, changes 
in stool, vomiting, steatorrhea. 
Genitourinary:
Denies: discharge, dysuria, frequency, hematuria, hesitation, nocturia, pain, 
urgency. 
Musculoskeletal:
Denies: back pain, gout, joint pain, joint swelling, muscle pain, muscle 
stiffness, neck pain. 
Skin:
Denies: cysts, change in skin color, change in hair/nails, dryness, erythema, 
jaundice, lesions, lymphangitis, lumps, moles, rash. 
Neurological/Psychological:
Denies: anxiety, ataxia, cognitive dysfunction, confusion, depressed, dementia, 
emotional problems, headache, numbness, paresthesia, pre-existing deficit, petit
mal seizures, tingling, tremors, tonic-clonic seizures, unable to move lower ext
, unable to move upper ext, weakness. 
Hematologic/Endocrine:
Denies: bruising, bleeding, polyuria, polydipsia. 
Immunologic/Allergic:
Denies: splenectomy, HIV/AIDS, lymphadenopathy. 
All Other Systems: Reviewed and Negative
 
Exam & Diagnostic Data
Vital Signs and I&O
Vital Signs
 
 
Date Time Temp Pulse Resp B/P B/P Pulse O2 O2 Flow FiO2
 
     Mean Ox Delivery Rate 
 
01/10 1615  101  142/80     
 
01/10 1447 98.1 88 18 142/80  98 Nasal 2.0L 
 
       Cannula  
 
01/10 1237  104  168/88     
 
01/10 1136       Nasal 2.0L 
 
       Cannula  
 
01/10 1111  96  190/102     
 
01/10 0800      97 Nasal 2.0L 
 
       Cannula  
 
01/10 0653 97.4 84 18 172/88  97 Room Air  
 
01/10 0000       Nasal 2.0L 
 
       Cannula  
 
01/09 2207 98.9 105 18 146/100  92   
 
01/09 1815 98.0 96 18 140/92  93 Room Air Room Air 
 
 
 Intake & Output
 
 
 01/10 1600 01/10 0400 01/09 1600 01/09 0400 01/08 1600 01/08 0400
 
Intake Total 1400 250 0   
 
Output Total 700     
 
Balance 700 250 0   
 
       
 
Intake, IV 1300 150    
 
Intake, Oral 100 100 0   
 
Output, Urine 700     
 
Patient  136 lb 136 lb   
 
Weight      
 
Weight   Estimated   
 
Measurement      
 
Method      
 
 
 
Physical Exam:
Well-developed, slightly malnourished, pleasant male, in no apparent distress. 
Sclera anicteric. Conjunctiva pink.  Oropharynx clear. No oral thrush. No 
apthous ulcers. No stridor. There is no adenopathy, thyromegaly, or JVD. 
Fibrotic right neck, post RT.  No peripheral stigmata of inflammatory bowel 
disease or chronic liver disease on exam. No spiders on the anterior chest wall.
 No gynecomastia. No CVA tenderness.  Lungs: few scattered rhonchi B/L, 
otherwise clear to A&P.  No wheezing, rales, or egophony. Heart exam: regular 
rate rhythm, S1 and S2, without any murmur.  Abdominal exam: normal bowel sounds
, soft belly, nontender, without guarding or rebound.  No mass.  No 
organomegaly.  No fluid shift.  No pulsatile mass. No epigastric bruit. Digital 
rectal exam: deferred. Extremities: without C, C, or E.  No palpable cords. ? 
mild spooning of nails (? if [place]-Elliott syndrome with hx esophageal web, but
no documented Fe deficiency). Distal pulses 2+ bilaterally.  DTRs 2+ 
bilaterally.  Alert and oriented x 3. Right handed. Motor 5/5 B/L. Brief 
neurologic exam: nonfocal, although a detailed exam for peripheral neuropathy 
was deferred.
 
 
Results
Pertinent Lab Results:
 Laboratory Tests
 
 
 01/10 01/10 01/10
 
 0757 0658 0000
 
Chemistry   
 
  Sodium (137 - 145 mmol/L)  142 
 
  Potassium (3.5 - 5.1 mmol/L)  4.5 
 
  Chloride (98 - 107 mmol/L)  101 
 
  Carbon Dioxide (22 - 30 mmol/L)  31  H 
 
  Anion Gap (5 - 16)  10 
 
  BUN (9 - 20 mg/dL)  17 
 
  Creatinine (0.7 - 1.2 mg/dL)  0.8 
 
  Estimated GFR (>60 ml/min)  > 60 
 
  BUN/Creatinine Ratio (7 - 25 %)  21.3 
 
  Troponin I (<0.11 ng/ml)   < 0.01
 
  TSH (0.270 - 4.200 uIU/mL)  4.240  H 
 
  Free T4 (0.78 - 2.44 ng/dL)  1.41 
 
Hematology   
 
  CBC w Diff NO MAN DIFF REQ  
 
  WBC (4.8 - 10.8 /CUMM) 11.2  H  
 
  RBC (4.70 - 6.10 /CUMM) 4.21  L  
 
  Hgb (14.0 - 18.0 G/DL) 11.7  L  
 
  Hct (42 - 52 %) 35.6  L  
 
  MCV (80.0 - 94.0 FL) 84.5  
 
  MCH (27.0 - 31.0 PG) 27.7  
 
  RDW (11.5 - 14.5 %) 14.5  
 
  Plt Count (130 - 400 /CUMM) 287  
 
  MPV (7.4 - 10.4 FL) 8.8  
 
  Gran % (42.2 - 75.2 %) 82.6  H  
 
  Lymphocytes % (20.5 - 51.1 %) 4.7  L  
 
  Monocytes % (1.7 - 9.3 %) 3.7  
 
  Eosinophils % (0 - 5 %) 8.9  H  
 
  Basophils % (0.0 - 2.0 %) 0.1  
 
  Absolute Granulocytes (1.4 - 6.5 /CUMM) 9.3  H  
 
  Absolute Lymphocytes (1.2 - 3.4 /CUMM) 0.5  L  
 
  Absolute Monocytes (0.10 - 0.60 /CUMM) 0.4  
 
  Absolute Eosinophils (0.0 - 0.7 /CUMM) 1.0  
 
  Absolute Basophils (0.0 - 0.2 /CUMM) 0  
 
  PUBS MCHC (33.0 - 37.0 G/DL) 32.8  L  
 
 
 
 
 01/09 01/09 01/09
 
 1631 1352 1223
 
Chemistry   
 
  Lactic Acid   Cancelled
 
  Troponin I (<0.11 ng/ml) < 0.01  
 
Urines   
 
  Urine Color (YEL,AMB,STR)  YEL 
 
  Urine Clarity (CLEAR)  CLEAR 
 
  Urine pH (5.0 - 8.0)  6.0 
 
  Ur Specific Gravity (1.001 - 1.035)  1.015 
 
  Urine Protein (NEG,<30 MG/DL)  TRACE  H 
 
  Urine Ketones (NEG)  NEG 
 
  Urine Nitrite (NEG)  NEG 
 
  Urine Bilirubin (NEG)  NEG 
 
  Urine Urobilinogen (0.1  -  1.0 EU/dl)  0.2 
 
  Ur Leukocyte Esterase (NEG)  NEG 
 
  Ur Microscopic  SEDIMENT EXAMINED 
 
  Urine RBC (0 - 5 /HPF)  1-3 
 
  Urine WBC (0 - 2 /HPF)  1-3  H 
 
  Ur Epithelial Cells (NONE,FEW)  RARE 
 
  Hyaline Casts (0/LPF)  1-3  H 
 
  Urine Mucus (FEW,NONE)  FEW 
 
  Urine Hemoglobin (NEG)  NEG 
 
  Urine Glucose (N MG/DL)  NEG 
 
 
 
 
 01/09 01/09
 
 0942 0937
 
Chemistry  
 
  Sodium (137 - 145 mmol/L)  142
 
  Potassium (3.5 - 5.1 mmol/L)  4.6
 
  Chloride (98 - 107 mmol/L)  100
 
  Carbon Dioxide (22 - 30 mmol/L)  31  H
 
  Anion Gap (5 - 16)  11
 
  BUN (9 - 20 mg/dL)  26  H
 
  Creatinine (0.7 - 1.2 mg/dL)  1.1
 
  Estimated GFR (>60 ml/min)  > 60
 
  BUN/Creatinine Ratio (7 - 25 %)  23.6
 
  Glucose (65 - 99 mg/dL)  146  H
 
  Lactic Acid (0.7 - 2.1 mmol/L)  1.9
 
  Calcium (8.4 - 10.2 mg/dL)  9.6
 
  Total Bilirubin (0.2 - 1.3 mg/dL)  1.1
 
  AST (17 - 59 U/L)  21
 
  ALT (21 - 72 U/L)  26
 
  Alkaline Phosphatase (< 127 U/L)  95
 
  Troponin I (<0.11 ng/ml)  < 0.01
 
  Total Protein (6.3 - 8.2 g/dL)  6.7
 
  Albumin (3.5 - 5.0 g/dL)  3.1  L
 
  Globulin (1.9 - 4.2 gm/dL)  3.6
 
  Albumin/Globulin Ratio (1.1 - 2.2 %)  0.9  L
 
Coagulation  
 
  PT (9.4 - 12.5 SEC)  14.8  H
 
  INR (0.90 - 1.17)  1.41  H
 
  APTT (25 - 37 SEC)  30
 
  D-Dimer High Sensitivty (0 - 243 ng/ml) Cancelled 882  H
 
Hematology  
 
  CBC w Diff  MAN DIFF ORDERED
 
  WBC (4.8 - 10.8 /CUMM)  19.0  H
 
  RBC (4.70 - 6.10 /CUMM)  4.75
 
  Hgb (14.0 - 18.0 G/DL)  12.9  L
 
  Hct (42 - 52 %)  40.2  L
 
  MCV (80.0 - 94.0 FL)  84.6
 
  MCH (27.0 - 31.0 PG)  27.2
 
  RDW (11.5 - 14.5 %)  14.3
 
  Plt Count (130 - 400 /CUMM)  339
 
  MPV (7.4 - 10.4 FL)  7.9
 
  Gran % (42.2 - 75.2 %)  93.4  H
 
  Lymphocytes % (20.5 - 51.1 %)  1.9  L
 
  Monocytes % (1.7 - 9.3 %)  2.4
 
  Eosinophils % (0 - 5 %)  2.3
 
  Basophils % (0.0 - 2.0 %)  0
 
  Absolute Granulocytes (1.4 - 6.5 /CUMM)  17.7  H
 
  Segmented Neutrophils (42.2 - 75.2 %)  81  H
 
  Band Neutrophils (0.0 - 5.0 %)  11  H
 
  Absolute Lymphocytes (1.2 - 3.4 /CUMM)  0.4  L
 
  Lymphocytes (20.5 - 51.1 %)  4  L
 
  Monocytes (1.7 - 9.3 %)  2
 
  Absolute Monocytes (0.10 - 0.60 /CUMM)  0.5
 
  Eosinophils (0 - 5.0 %)  2
 
  Absolute Eosinophils (0.0 - 0.7 /CUMM)  0.4
 
  Absolute Basophils (0.0 - 0.2 /CUMM)  0
 
  Platelet Estimate (ADEQUATE)  ADEQUATE
 
  Normocytic RBCs  VERIFIED
 
  Normochromic RBCs  VERIFIED
 
  PUBS MCHC (33.0 - 37.0 G/DL)  32.1  L
 
 
 
Imaging/Other Studies:
01/09/18: CTA CHEST WITH CONTRAST (CT PULMONARY ANGIOGRAM FOR PE)-
1. No pulmonary embolism.
2. Findings are consistent with infectious bronchiolitis, bronchopneumonia B/L, 
R > L,
and associated hilar and mediastinal lymphadenopathy. Query whether patient
is clinically at risk for aspiration.
3. Trace right pleural effusion.
4. Old fractures left lateral 6th & 7th ribs.
 
01/10/18: EKG- NSR @ 84, normal axis, normal intervals, occasional unifocal PVCs
, q lat.
 
01/10/18:  XRY-MODIFIED BARIUM SWALLOW-
Laryngeal penetration as well as aspiration were noted. Please see the speech 
pathology report 
for details (*per speech pathology, the patient had decreased pharyngeal 
constriction and laryngeal elevation, resulting in moderate residual in the 
valleculae and along the posterior pharyngeal wall.  He was able to clear the 
majority of the residual with multiple spontaneous dry swallows and cough/
swallow.  He had transient penetration with honey and nectar thick liquids and 
thin liquids to the level of the vocal folds, which cleared upon completion of 
swallow.  He had aspiration of thin liquids via straw, score 8/8 on penetration/
aspiration scale. **They advised pharyngeal/laryngeal strengthening exercises, &
recommended ground diet, nectar liquids, and liquids via spoon, with multiple 
swallows at a reduced rate at 1 teaspoon at a time).
 
01/10/18: ECHOCARDIOGRAM-
Normal left ventricular size, wall thickness and systolic function  
 with no obvious regional wall motion abnormalities. 
 The ejection fraction is visually estimated at  >65   %. 
 Abnormal relaxation filling pattern of the left ventricle for age  
 (stage 1 diastolic dysfunction). 
 The left atrium is normal in size. 
 The mitral valve is normal in structure and function. 
 Focal thickening of the aortic valve cusps. 
 No aortic stenosis. 
 No aortic regurgitation. 
 Unable to estimate the right ventricular systolic pressure. 
- Javier Ramos M.D. 
 
Assessment/Plan
Assessment/Recommendations:
68 y/o male, HTN (occasionally labile, requiring Midodrine), non-DM, HLD, ASHD 
with coronary stenosis & right carotud stenosis, prostate Ca post prostatectomy,
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 
dysfunction with multiple esophageal dilatations per Dr. ATUL Ramos, last done 
07/05/17, with dilatation of upper esophageal web. The patient claimed his 
swallowing issues did not improve a lot after his last dilitation. *The patient 
remotely had a PEG from 10/2010-04/2011. 03/26/13: Last PET- no gross 
recurrence. 10/08/15: Normal PFTs. 
 
The patient presented to the [facility] ER 01/09/18 at 8:59 p.m. with worsening of 
chronic cough and hypoxia, at the recommendation of his PMD, with O2 sat 80's 
RA. Upon arrival, /95, P 124, R 18, T 97.5, O2 sat 87%. His O2 sat later 
improved to 95% on 2L.
 
Since 2010, the patient had multiple aspiration episodes and dysphagia, becoming
more frequent and severe.  In 11/2017, he developed a productive cough of clear 
sputum that didn't improve with with outpatient management with antibiotics.  He
denied any fevers, chills, shortness of breath, chest pain, palpitation, nausea,
vomiting, odynophagia, hemetemesis, melena, early satiety, or abdominal pain.  
He saw Dr. Ackerman of ENT, who recommended a swallowing evaluation at [facility], 
which was not yet done.  The patient's most recent outpatient diet predominantly
consisted of oatmeal and yogurt.  He had dysphagia with liquids and solids, but 
tolerated pills.  He had unintentionally lost 30 pounds over the past 6 months 
PTA, since 06/2017, attributed to altering his diet so that he not have 
swallowing difficulty.  He denied any fevers, chills, rashes, hemoptysis, 
pleuritic pain, or jaundice.  As the sticking was mostly near the upper 
esophageal inlet by description, there was a component of transfer dysphagia.  
Apparently, a recent outpatient ENT evaluation failed to reveal any local tumor 
recurrence.  There is no known history of thyroid disease or CVA. There was no 
nasal regurgitation of food or liquids.  There was no change in his voice.  He 
denied any diarrhea, constipation, obstipation, tenesmus, or bleeding.  
 
He was rxd Robitussin, Omeprazole 40 mg daily, nebulizers, and IV Unasyn 3g Q6h,
with improvement of leukocytosis & O2 sats. *The patient stated he just 
tolerated ground chicken and broccoli for dinner uneventfully, although he still
had a cough.
 
*The patient has had multiple endoscopic procedures by Dr. ATUL Ramos.  He has a
history of colon adenoma and positive family history of colon polyps. There is 
no family history of any additional GI or liver issues.
 
12/09/15: Last colonoscopy to [facility]- enlarged internal hemorrhoids, left-sided 
diverticula, 2 subcm polyps removed- both benign TVA, without invasive Ca.
 
07/05/17: EGD with esophageal dilitation for dysphagia, post head & neck Ca- 
normal hypopharynx, proximal esophageal web, with slight narrowing of the 
esophageal lumen, beyond which, the caliber, contour, and mucosa of the 
esophagus was normal, the junction of 40 cm, normal stomach, normal duodenum to 
the sweep. *The area of the esophageal inlet and proximal esophagus was dilated 
for 1 minute using a 19 mm CRE balloon, with disruption of the lab. 
 
01/09/17: Admission labs- WBC 19 (81S/11B/4L/2M/2E), H/H 12.9/40.2, MCV 84.6, 
RDW 14.3, , PT 14.8, INR 1.41, PTT 30, glucose 146, BUN/Cr 26/1.1, GFR > 
60, Na 142, K 4.6, HCO3 31, AG 11, nl lactate 1.9, Ca 9.6, albumin 3.1, globulin
3.6, TBil 1.1, alk phos 95, AST 21, ALT 26, troponin < 0.01 (x 3), *elevated d-
Dimer 882; U/A- clear, yellow, 1.015. 6.0, 1-3 RBC, 1-3 WBC, 1-3 hyaline casts, 
tr protein; neg nitrite, neg esterase.
 
01/09/18: BC x 2- negative so far.
01/09/18: UC- negative x 1 day.
01/10/18: Sputum gram stain- many WBC, few squamous cells, few GPC, rare GNR & 
GN cocci, mod GPR, mod budding yeast; Sputum C&S- pending.
 
01/10/18: WBC 11.2, H/H 11.7/35.6, , BUN/Cr 17/0.8, GFR > 60, essentially
normal electrolytes, mildly elevated TSH 4.24, normal FT4 1.41
 
01/09/18: CTA CHEST WITH CONTRAST (CT PULMONARY ANGIOGRAM FOR PE)-
1. No pulmonary embolism.
2. Findings are consistent with infectious bronchiolitis, bronchopneumonia B/L, 
R > L,
and associated hilar and mediastinal lymphadenopathy. Query whether patient
is clinically at risk for aspiration.
3. Trace right pleural effusion.
4. Old fractures left lateral 6th & 7th ribs.
 
01/10/18: EKG- NSR @ 84, normal axis, normal intervals, occasional unifocal PVCs
, q lat.
 
01/10/18:  XRY-MODIFIED BARIUM SWALLOW-
Laryngeal penetration as well as aspiration were noted. Please see the speech 
pathology report 
for details (*per speech pathology, the patient had decreased pharyngeal 
constriction and laryngeal elevation, resulting in moderate residual in the 
valleculae and along the posterior pharyngeal wall.  He was able to clear the 
majority of the residual with multiple spontaneous dry swallows and cough/
swallow.  He had transient penetration with honey and nectar thick liquids and 
thin liquids to the level of the vocal folds, which cleared upon completion of 
swallow.  He had aspiration of thin liquids via straw, score 8/8 on penetration/
aspiration scale. **They advised pharyngeal/laryngeal strengthening exercises, &
recommended ground diet, nectar liquids, and liquids via spoon, with multiple 
swallows at a reduced rate at 1 teaspoon at a time).
 
01/10/18: ECHOCARDIOGRAM-
Normal left ventricular size, wall thickness and systolic function  
 with no obvious regional wall motion abnormalities. 
 The ejection fraction is visually estimated at  >65   %. 
 Abnormal relaxation filling pattern of the left ventricle for age  
 (stage 1 diastolic dysfunction). 
 The left atrium is normal in size. 
 The mitral valve is normal in structure and function. 
 Focal thickening of the aortic valve cusps. 
 No aortic stenosis. 
 No aortic regurgitation. 
 Unable to estimate the right ventricular systolic pressure. 
- Javier Ramos M.D. 
 
*The patient will be scheduled for an EGD with probable repeat esophageal 
dilatation, to be done tomorrow, 01/11/18, most likely by Dr. Olivera, based on 
hospital logistics.  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 
had been repeatedly dilated.  Having stated that, the patient said that after 
his most recent 07/05/17: esophageal dilatation, his swallowing did not improve 
that much.  It certainly is possible that some of his swallowing issues could be
related to his prior radiation therapy.  Rule out esophageal dysmotility.  I did
not see any recent esophageal biopsies to exclude EOE.  At present, the patient'
s oxygen saturation has stabilized, and he seemed to be in adequate shape for an
endoscopy. He reportedly did not have any local recurrence of his head & neck CA
, at a recent ENT visit.  He was receiving Robitussin, Omeprazole 40 mg daily, 
nebulizers, and IV Unasyn. 
 
*SUGGEST:
NPO after 11:59 p.m. tonight for EGD with probable esophageal dilitation, on 01/
11/18, most likely to be performed by Dr. Olivera, who is covering the hospital 
tomorrow. I will leave it at the discretion of the endoscopist to consider 
random esophageal biopsies.  Continue aspiration precautions.  Continue 
Robitussin, PPI, nebulizers, & IV Unasyn. DVT prohylaxis. Hopefully, if there is
recurrence of an esophageal web, the patient will benefit from repeat 
dilatation.  Follow-up with ENT.  Follow-up with pulmonary.  *Eventual 
pharyngeal/laryngeal strengthening exercises, & recommended  ground diet, nectar
liquids, and liquids via spoon, with multiple swallows at a reduced rate at 1 
teaspoon at a time, as per speech pathology note of 01/10/18, done at time of 
MBS, depending on EGD findings.  At the moment, the patient was not desirous of 
a PEG, which was potentially discussed with the patient.  *The patient remotely 
had a PEG from 10/2010-
04/2011. Hopefully he will improve with the above therapy.  Further GI 
recommendations to follow, depending on EGD results.
 
*ADDENDUM- 01/10/2018 at 6:45 p.m.- 
At the conclusion of the GI consultation, the patient and his wife were 
surprised that I offered them an EGD.  The patient stated that he just tolerated
a ground diet, and was looking forward to eating breakfast tomorrow.  He also 
stated that he was told by the hospitalist service that he would be discharged 
tomorrow. At his request, I contacted Dr. Stubbs, of the hospitalist service, who
initially placed the GI consult. Dr. Stubbs told me he would defer to the patient
's wishes. As the patient seemed to be improving & would probably be discharged 
to home tomorrow, anyway, EGD was cancelled, as per patient.  Therefore, would 
continue diet as recommended by speech pathology, as above, along with 
aspiration precautions.  Eventual pharyngeal/laryngeal strengthening exercises, 
as per speech pathology.  Follow-up with ENT and pulmonary. Continue Robitussin,
PPI, nebulizers, & IV Unasyn (eventual conversion to po antibiotics, as per the 
hospitalist service). Follow-up cultures, per medical team. The patient was told
to follow up with his usual GI MD, Dr. Carter, as an outpatient, for further
recommendations.  The above was discussed with the medical house staff.  Further
inpatient GI follow-up as needed. 
Problem List:
 1. Dysphagia
 
 2. Aspiration pneumonia
 
 3. Acute respiratory failure with hypoxia
 
 4. Head and neck cancer
 
Copies To:
Evelyne DOWNING,Priti; Leonardo DOWNING,Shaina VILLASENOR; Sonny DOWNING,Javier GREENFIELD; Tyron DOWNING,Krzysztof SILVA; Mika DOWNING,[facility]; Richard DOWNING,Mingo MEJIA
 
Consult Acknowledgment
- Thank you for your consult request.

## 2018-01-10 NOTE — PN- HOUSESTAFF
**See Addendum**
Subjective
Follow-up For:
Aspiration pneumonia
Dysphagia
Acute respiratory failure
Subjective:
Patient was seen and examined this morning, continue to be on 2 L oxygen with 
saturation 97%
Patient is afebrile.  His blood pressure is very labile, the highest reading 
this morning was 190/110 for which I contacted his cardiologist at [place] Dr. Dukes at [phone] for recommendation. 
Patient is very hungry and wants to eat pending swallow evaluation.
 
Review of Systems
Constitutional:
Reports: see HPI. 
 
Objective
Last 24 Hrs of Vital Signs/I&O
 Vital Signs
 
 
Date Time Temp Pulse Resp B/P B/P Pulse O2 O2 Flow FiO2
 
     Mean Ox Delivery Rate 
 
01/10 1447 98.1 88 18 142/80  98 Nasal 2.0L 
 
       Cannula  
 
01/10 1237  104  168/88     
 
01/10 1136       Nasal 2.0L 
 
       Cannula  
 
01/10 1111  96  190/102     
 
01/10 0800      97 Nasal 2.0L 
 
       Cannula  
 
01/10 0653 97.4 84 18 172/88  97 Room Air  
 
01/10 0000       Nasal 2.0L 
 
       Cannula  
 
 2207 98.9 105 18 146/100  92   
 
 1815 98.0 96 18 140/92  93 Room Air Room Air 
 
 1634 98.1 95 18 155/93  93 Nasal 2.0L 
 
       Cannula  
 
 
 Intake & Output
 
 
 01/10 1600 01/10 0800 01/10 0000
 
Intake Total 600 800 250
 
Output Total  700 
 
Balance 600 100 250
 
    
 
Intake,  700 150
 
Intake, Oral 0 100 100
 
Output, Urine  700 
 
Patient   61.689 kg
 
Weight   
 
 
 
 
Physical Exam
General Appearance: Alert, Oriented X3, Cooperative, No Acute Distress
Skin: No Rashes
HEENT: Atraumatic, PERRLA, EOMI, Mucous Membr. moist/pink
Neck: Supple
Cardiovascular: Regular Rate, Normal S1, Normal S2, No Murmurs
Lungs: Clear to Auscultation, Normal Air Movement
Abdomen: Normal Bowel Sounds, Soft, No Tenderness
Neurological: Normal Gait, Normal Speech, Strength at 5/5 X4 Ext, Normal Tone, 
Sensation Intact, Cranial Nerves 3-12 NL, Reflexes 2+
Extremities: No Clubbing, No Cyanosis, No Edema, Normal Pulses
 
Assessment/Plan
Assessment:
Mr. Bosworth is 67 year old male with past medical history significant for  
labile blood pressure, coronary artery disease with history of coronary stenosis
and right carotid stenosis, prostate cancer status post prostatectomy, throat 
and tongue cancer status post radiation and chemotherapy  complicated by 
esophageal dysfunction had multiple esophageal dilatation procedure (Dr. Ramos (GI) 3 esophageal dilatations) last one 2017 who presented to ED 
with chief complaint of hypoxia.  
 
 
Problem list
#Aspiration pneumonia/pneumonitis in sitting of chronic dysphagia as a result of
dysphagia stricture
#Acute hypoxic respiratory failure
#Sinus tachycardia could be stress induced versus hypoxia
#Labile Hypertension
#History of coronary artery disease
 
Plan
#Aspiration pneumonia/pneumonitis
#Acute hypoxic respiratory failure 
-In sitting of chronic dysphagia as a result of dysphagia stricture
-Leukocytosis improved from 19 to 11.2 with no bands
-Continue IV Unasyn
-Patient is not on oxygen at home, continue 2 L oxygen nasal cannula and wean as
possible
-We'll obtain pulmonary consultation
-Waiting for modified barium swallow evaluation to start diet she is on D5 75/h
-Spoke with Dr. Ackerman (ENT), he reported normal laryngeal function, will keep
him updated
-Follow sputum culture and blood culture
-Protonix 40 daily
-Robitussin with codeine every afternoon
 
 
#Sinus tachycardia 
-Could be stress induced versus hypoxia versus infection
-TSH slightly elevated 4.24, within normal 3 T4 mostly represents sick sinus 
syndrome
-CTA negative for PE
 
#Labile Hypertension
-Fluctuating blood pressure however elevated since admission
-Spoke with cardiologist at [place] Dr. Dukes at [phone], he recommended 
continuing midodrine and adding hydralazine 60 mg 3 times a day
-He recommended monitoring blood pressure and without aggressive action
-Echocardiogram
 CONCLUSIONS 
 Normal left ventricular size, wall thickness and systolic function  
 with no obvious regional wall motion abnormalities. 
 The ejection fraction is visually estimated at  >65   %. 
 Abnormal relaxation filling pattern of the left ventricle for age  
 (stage 1 diastolic dysfunction). 
 The left atrium is normal in size. 
 The mitral valve is normal in structure and function. 
 Focal thickening of the aortic valve cusps. 
 No aortic stenosis. 
 No aortic regurgitation. 
 Unable to estimate the right ventricular systolic pressure. 
 
 
 
-Continue home medication midodrine 2.5 mg 3 times a day and pravastatin 40 mg 
daily
-DVT prophylaxis Lovenox
-Diet nothing by mouth pending swallow eval
-Code full code
 
Problem List:
 1. Dysphagia
 
 2. Aspiration pneumonia
 
 3. Acute respiratory failure with hypoxia
 
Pain Ratin
Pain Location:
N/A
Pain Goal: Pain 4 or less
Pain Plan:
See medication 
Tomorrow's Labs & Rationales:
CBC in setting of infection

## 2018-01-10 NOTE — RADIOLOGY REPORT
EXAMINATION:
MODIFIED BARIUM SWALLOW
 
CLINICAL INFORMATION:
Dysphagia.
 
COMPARISON:
05/16/2017
 
TECHNIQUE:
Routine airway fluoroscopy was performed. The patient took thin barium
without difficulty.
 
FINDINGS:
The examination was performed with the speech pathologist present. The
patient took several consistencies of barium without difficulty. Laryngeal
penetration was noted as well as one episode of aspiration with coughing.
Please see the speech pathology report for complete results.
 
FLUOROSCOPY TIME:
2 minutes 15 seconds of fluoroscopic time was utilized for the entirety of
both studies.
 
IMPRESSION:
Laryngeal penetration as well as aspiration were noted. Please see the speech
pathology report for details.

## 2018-01-11 VITALS — SYSTOLIC BLOOD PRESSURE: 112 MMHG | DIASTOLIC BLOOD PRESSURE: 60 MMHG

## 2018-01-11 VITALS — SYSTOLIC BLOOD PRESSURE: 122 MMHG | DIASTOLIC BLOOD PRESSURE: 78 MMHG

## 2018-01-11 VITALS — DIASTOLIC BLOOD PRESSURE: 60 MMHG | SYSTOLIC BLOOD PRESSURE: 112 MMHG

## 2018-01-11 VITALS — SYSTOLIC BLOOD PRESSURE: 104 MMHG | DIASTOLIC BLOOD PRESSURE: 50 MMHG

## 2018-01-11 VITALS — DIASTOLIC BLOOD PRESSURE: 58 MMHG | SYSTOLIC BLOOD PRESSURE: 108 MMHG

## 2018-01-11 LAB
ABSOLUTE GRANULOCYTE CT: 6.9 /CUMM (ref 1.4–6.5)
BASOPHILS # BLD: 0 /CUMM (ref 0–0.2)
BASOPHILS NFR BLD: 0.3 % (ref 0–2)
EOSINOPHIL # BLD: 0.8 /CUMM (ref 0–0.7)
EOSINOPHIL NFR BLD: 9.7 % (ref 0–5)
ERYTHROCYTE [DISTWIDTH] IN BLOOD BY AUTOMATED COUNT: 14.4 % (ref 11.5–14.5)
GRANULOCYTES NFR BLD: 79.4 % (ref 42.2–75.2)
HCT VFR BLD CALC: 34.4 % (ref 42–52)
LYMPHOCYTES # BLD: 0.5 /CUMM (ref 1.2–3.4)
MCH RBC QN AUTO: 27.5 PG (ref 27–31)
MCHC RBC AUTO-ENTMCNC: 32.8 G/DL (ref 33–37)
MCV RBC AUTO: 83.7 FL (ref 80–94)
MONOCYTES # BLD: 0.4 /CUMM (ref 0.1–0.6)
PLATELET # BLD: 308 /CUMM (ref 130–400)
PMV BLD AUTO: 8.4 FL (ref 7.4–10.4)
RED BLOOD CELL CT: 4.11 /CUMM (ref 4.7–6.1)
WBC # BLD AUTO: 8.7 /CUMM (ref 4.8–10.8)

## 2018-01-11 NOTE — CONS- PULMONARY
General Information and HPI
 
Consulting Request
Date of Consult: 01/11/18
Requested By:
oscar
Reason for Consult:
Aspiration pneumonia
History of Present Illness:
Patient is 67-year-old gentleman with chronic cough status post head and neck 
cancer status post radiation and chemotherapy who is had long-standing 
esophageal dysfunction there is minimal aspiration pneumonia.  He presented with
hypoxia CT scan shows evidence of airway infection and inflammation.  Of note 
his subcarinal lymph node has increased from 1.1-2 cm since 2015.  This may be 
related to chronic inflammatory infectious pulmonary process secondary to 
chronic aspiration.  Patient's oxygen levels are improved
 
Allergies/Medications
Allergies:
Coded Allergies:
NO KNOWN ALLERGIES (02/06/15)
 
Home Med List:
Midodrine HCl 2.5 MG TABLET   1 TAB PO TID BP  (Reported)
Pantoprazole Sodium 40 MG TABLET.DR   1 TAB PO DAILY GERD  (Reported)
Pravastatin Sodium 40 MG TABLET   1 TAB PO QPM CHOLESTEROL  (Reported)
 
 
Review of Systems
 
Review of Systems
Constitutional:
Denies: chills, fever. 
Cardiovascular:
Denies: chest pain. 
Respiratory:
Reports: cough.  Denies: hemoptysis. 
 
Past History
 
Travel History
Traveled to [place] past 21 day No
 
Medical History
Blood Transfusion Hx: No
Neurological: NONE
EENT: laryngeal & throat Ca, HPV +
Cardiovascular: CAD, hypertension, hyperlipidemia
Respiratory: chronic cough
Gastrointestinal: dysphagia, upper esophageal web, colon TVA diverticulosis coli
Hepatic: NONE
Renal: NONE
Musculoskeletal: NONE
Psychiatric: NONE
Endocrine: NONE
Blood Disorders: NONE
Cancer(s): prostate cancer, THROAT/TONGUE CANCER
GYN/Reproductive: NONE
 
Surgical History
Surgical History: prostatectomy, feeding tube (2010) Bilateral heel repair Left 
knee  arthroscopic surgery Rhinoplasty
 
Family History
Relations & Conditions If Any:
FATHER (prostate Ca/Paget's).
BROTHER (prostate Ca).
BROTHER (prostate Ca).
SISTER (Hodgkin's lymphoma).
 
 
Psychosocial History
Where Do You Live? Home
Who Do You Live With? spouse
Services at Home: None
Primary Language: English
Smoking Status: Former Smoker
ETOH Use: occasional use
Illicit Drug Use: denies illicit drug use
Living Will? yes
Power of /HCP? yes
Name of POA/HCP: pt's wife, Lisa Bosworth
Other Social History:
. 4 children ((3 sons & 1 dtr)- [employer].
 Ex-7 pk yr cigarette smoker, D/C 1960's. Mild
 EtOH. No illicit drugs. Retired .
 
Functional Ability
ADLs
Independent: dressing, eating, toileting, bathing. 
Ambulation: independent
IADLs
Independent: shopping, housework, finances, food prep, telephone, transportation
, medication admin. 
 
Employment History
Employment: Retired
Profession/Employer: retired 
 
ECHO Results (as available)
Date of last Echo 01/10/18
EF% 65
 
Exam & Diagnostic Data
Last 24 Hrs of Vital Signs/I&O
 Vital Signs
 
 
Date Time Temp Pulse Resp B/P B/P Pulse O2 O2 Flow FiO2
 
     Mean Ox Delivery Rate 
 
01/11 0641    122/78     
 
01/11 0546 97.8 86 18 108/58  99 Nasal  
 
       Cannula  
 
01/11 0000      96 Nasal 2.5L 
 
       Cannula  
 
01/10 2214 97.9 98 18 120/78  95 Nasal 3.0L 
 
       Cannula  
 
01/10 2208  90  120/78     
 
01/10 1900      90 Nasal 2.0L 
 
       Cannula  
 
01/10 1615  101  142/80     
 
01/10 1600      98 Nasal 2.0L 
 
       Cannula  
 
01/10 1447 98.1 88 18 142/80  98 Nasal 2.0L 
 
       Cannula  
 
01/10 1237  104  168/88     
 
01/10 1136       Nasal 2.0L 
 
       Cannula  
 
01/10 1111  96  190/102     
 
 
 Intake & Output
 
 
 01/11 1600 01/11 0800 01/11 0000
 
Intake Total  620 1210
 
Output Total   
 
Balance  620 1210
 
    
 
Intake, IV  420 430
 
Intake, Oral  200 780
 
 
Oxygen saturation room air 93% HEENT exam shows no adenopathy exam of his chest 
shows scattered crackles there are no wheezes cardiac exam shows regular S1 and 
S2 without murmurs diminished soft nontender
Last 48 Hrs of Labs/Cristian:
 Laboratory Tests
 
01/11/18 0619:
CBC w Diff Pending, WBC Pending, RBC Pending, Hgb Pending, Hct Pending, MCV 
Pending, MCH Pending, RDW Pending, Plt Count Pending, MPV Pending, PUBS MCHC 
Pending
 
01/10/18 0757:
CBC w Diff NO MAN DIFF REQ, RBC 4.21  L, MCV 84.5, MCH 27.7, RDW 14.5, MPV 8.8, 
Gran % 82.6  H, Lymphocytes % 4.7  L, Monocytes % 3.7, Eosinophils % 8.9  H, 
Basophils % 0.1, Absolute Granulocytes 9.3  H, Absolute Lymphocytes 0.5  L, 
Absolute Monocytes 0.4, Absolute Eosinophils 1.0, Absolute Basophils 0, PUBS 
MCHC 32.8  L
 
01/10/18 0658:
Anion Gap 10, Estimated GFR > 60, BUN/Creatinine Ratio 21.3, TSH 4.240  H, Free 
T4 1.41
 
01/10/18 0000:
Troponin I < 0.01
 
01/09/18 1631:
Troponin I < 0.01
 
01/09/18 1352:
Urine Color YEL, Urine Clarity CLEAR, Urine pH 6.0, Ur Specific Gravity 1.015, 
Urine Protein TRACE  H, Urine Ketones NEG, Urine Nitrite NEG, Urine Bilirubin 
NEG, Urine Urobilinogen 0.2, Ur Leukocyte Esterase NEG, Ur Microscopic SEDIMENT 
EXAMINED, Urine RBC 1-3, Urine WBC 1-3  H, Ur Epithelial Cells RARE, Hyaline 
Casts 1-3  H, Urine Mucus FEW, Urine Hemoglobin NEG, Urine Glucose NEG
 
01/09/18 1223:
Lactic Acid Cancelled
 
01/09/18 0942:
D-Dimer High Sensitivty Cancelled
 
01/09/18 0937:
Anion Gap 11, Estimated GFR > 60, BUN/Creatinine Ratio 23.6, Glucose 146  H, 
Lactic Acid 1.9, Calcium 9.6, Total Bilirubin 1.1, AST 21, ALT 26, Alkaline 
Phosphatase 95, Troponin I < 0.01, Total Protein 6.7, Albumin 3.1  L, Globulin 
3.6, Albumin/Globulin Ratio 0.9  L, PT 14.8  H, INR 1.41  H, APTT 30, D-Dimer 
High Sensitivty 882  H, CBC w Diff MAN DIFF ORDERED, RBC 4.75, MCV 84.6, MCH 
27.2, RDW 14.3, MPV 7.9, Gran % 93.4  H, Lymphocytes % 1.9  L, Monocytes % 2.4, 
Eosinophils % 2.3, Basophils % 0, Absolute Granulocytes 17.7  H, Segmented 
Neutrophils 81  H, Band Neutrophils 11  H, Absolute Lymphocytes 0.4  L, 
Lymphocytes 4  L, Monocytes 2, Absolute Monocytes 0.5, Eosinophils 2, Absolute 
Eosinophils 0.4, Absolute Basophils 0, Platelet Estimate ADEQUATE, Normocytic 
RBCs VERIFIED, Normochromic RBCs VERIFIED, PUBS MCHC 32.1  L
 
 
Assessment/Plan
Impression/Plan:
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 
dysfunction requiring dilatation and has evidence of aspiration on barium 
swallow.  He has improved with treatment of aspiration pneumonia.  Acute hypoxic
restrictive failure has improved.  He's been instructed as to necessary 
interventions to avoid aspiration.
Recommendations:
Assess exercise oximetry.  Continue speech therapy for aspiration prevention.  
He will need a follow-up CT scan as outpatient.  As well pulmonary function test
should be obtained.  Follow-up sputum C&S
 
 
Consult Acknowledgment
- Thank you for your consult request.

## 2018-01-11 NOTE — PATIENT DISCHARGE INSTRUCTIONS
Discharge Instructions
 
General Discharge Information
Special Instructions:
-PLEASE FOLLOW UP WITH PCP AFTER DISCHARGE
-PLEASE FOLLOW UP WITH CARDIOLOGY AFTER DISCHARGE
-PLEASE FOLLOW UP WITH GI DR. PINZON AFTER DISCHARGE FOR ENDOSCOPY
-PLEASE FOLLOW UP WITH SPEECH THERAPY AFTER DISCHARGE MAINWHILE CONTINUE THE 
RECOMMENDED DIET
-PLEASE FOLLOW UP WITH ENT AFTER DISCHARGE
 
Acute Coronary Syndrome
 
Inclusion Criteria
At DC or during hospital stay patient has or had the following:
ACS DIAGNOSIS No
 
Discharge Core Measures
Meds if any: Prescribed or Continued at Discharge
Meds if any: NOT Prescribed or Continued at Discharge
 
Congestive Heart Failure
 
Inclusion Criteria
At DC or during hospital stay patient has or had the following:
CHF DIAGNOSIS No
 
Discharge Core Measures
Meds if any: Prescribed or Continued at Discharge
Meds if any: NOT Prescribed or Continued at Discharge
 
Cerebrovascular accident
 
Inclusion Criteria
At DC or during hospital stay patient has or had the following:
CVA/TIA Diagnosis No
 
Discharge Core Measures
Meds if any: Prescribed or Continued at Discharge
Meds if any: NOT Prescribed or Continued at Discharge
 
Venous thromboembolism
 
Inclusion Criteria
VTE Diagnosis No
VTE Type NONE
VTE Confirmed by (Test) CT CHEST ANGIOGRAM
 

## 2018-01-11 NOTE — EVENT NOTE
Event Note
Event Note:
Patient was started on grounds soft and nectar thick liquids yesterday, had 
dinner and breakfast without significant coughing.  Later the day patient had 
lunch and complained of coughing.  I discussed with the patient, his wife and 
sister that this diet is not a definitive treatment, he is not willing to do PEG
tube and the best diet giving his condition and the barium swallow evaluation 
yesterday is what was recommended by speech therapy.  
Patient understand that this diet is not curing his pre-existing condition, 
needs to follow-up with speech therapy, ENT and GI.  He is also not willing to 
stay for endoscopy and wants to follow up with GI Dr. Ramos next week.  
Patient's vital signs are stable, saturating 93% on room air, not acute distress
or significant coughing.
It was explained to them that if patient started to have severe coughing/
chocking they should call ambulance [id number].

## 2018-01-11 NOTE — PN- HOUSESTAFF
Nicole ODWNING,[facility] 18 1031:
Subjective
Follow-up For:
Aspiration pneumonia
Dysphagia
Acute respiratory failure
Subjective:
Patient was seen and examined this morning, reporting improvement of his cough. 
Afebrile, has tachycardia of 110 since yesterday, will contact his cardiologist 
at  this morning for further recommendation.
 
Review of Systems
Constitutional:
Reports: see HPI. 
 
Objective
Last 24 Hrs of Vital Signs/I&O
 Vital Signs
 
 
Date Time Temp Pulse Resp B/P B/P Pulse O2 O2 Flow FiO2
 
     Mean Ox Delivery Rate 
 
 1105  89  112/60     
 
 1105  89  112/60     
 
 1102  90  112/60     
 
 0912  105  104/50  93 Room Air  
 
 0837      92 Room Air  
 
 0800      93 Room Air  
 
 0641    122/78     
 
 0546 97.8 86 18 108/58  99 Nasal  
 
       Cannula  
 
 0000      96 Nasal 2.5L 
 
       Cannula  
 
01/10 2214 97.9 98 18 120/78  95 Nasal 3.0L 
 
       Cannula  
 
01/10 2208  90  120/78     
 
01/10 1900      90 Nasal 2.0L 
 
       Cannula  
 
01/10 1615  101  142/80     
 
01/10 1600      98 Nasal 2.0L 
 
       Cannula  
 
 
 Intake & Output
 
 
  1600  0800  0000
 
Intake Total  620 1210
 
Output Total   
 
Balance  620 1210
 
    
 
Intake, IV  420 430
 
Intake, Oral  200 780
 
Patient 61.689 kg  
 
Weight   
 
 
 
 
Physical Exam
General Appearance: Alert, Oriented X3, Cooperative, No Acute Distress
Skin: No Rashes
Skin Temp/Moisture Exam: Warm/Dry
HEENT: Atraumatic, PERRLA, EOMI, Mucous Membr. moist/pink
Neck: Supple
Cardiovascular: Regular Rate, Normal S1, Normal S2, No Murmurs
Lungs: Clear to Auscultation, Normal Air Movement
Abdomen: Normal Bowel Sounds, Soft, No Tenderness
Neurological: Normal Gait, Normal Speech, Strength at 5/5 X4 Ext, Normal Tone, 
Sensation Intact, Cranial Nerves 3-12 NL, Reflexes 2+
Extremities: No Clubbing, No Cyanosis, No Edema, Normal Pulses
 
Assessment/Plan
Assessment:
Mr. Bosworth is 67 year old male with past medical history significant for  
labile blood pressure, coronary artery disease with history of coronary stenosis
and right carotid stenosis, prostate cancer status post prostatectomy, throat 
and tongue cancer status post radiation and chemotherapy  complicated by 
esophageal dysfunction had multiple esophageal dilatation procedure (Dr. Ramos (GI) 3 esophageal dilatations) last one 2017 who presented to ED 
with chief complaint of hypoxia.  
 
 
Problem list
#Aspiration pneumonia/pneumonitis in sitting of chronic dysphagia as a result of
dysphagia stricture
#Acute hypoxic respiratory failure
#Sinus tachycardia could be stress induced versus hypoxia
#Labile Hypertension
#History of coronary artery disease
 
Plan
#Aspiration pneumonia/pneumonitis
#Acute hypoxic respiratory failure 
-In sitting of chronic dysphagia as a result of dysphagia stricture
-Afebrile and no leukocytosis
-Continue IV Unasyn, switched to Augmentin to finish total of 10 days
-Oxygen saturation on rest and ambulation room air is 93%, will discontinue 
oxygen via nasal cannula 
-Pulmonary consultation was obtained, thanks the recommendation
-Condition for outpatient CT follow-up and pulmonary function test
-Modified barium swallow and speech evaluations were obtained yesterday, 
recommendation for grandparent so and honey thick liquid 
-Spoke with Dr. Ackerman (ENT), he reported normal laryngeal function, will keep
him updated
-Follow sputum culture and blood culture
-Protonix 40 daily
-Robitussin with codeine every afternoon
 
 
#Sinus tachycardia 
-Contacted Dr. Dukes, patient's cardiologist at [place], recommendation to decrease
dose of hydralazine to 25 mg 3 times a day and start metoprolol 25 mg twice a 
day 
-TSH slightly elevated 4.24, within normal 3 T4 mostly represents sick sinus 
syndrome
-CTA negative for PE
 
#Labile Hypertension
-Fluctuating blood pressure however elevated since admission
-Spoke with cardiologist at [place] Dr. Dukes at [phone], he recommended 
continuing midodrine and adding hydralazine 60 mg 3 times a day-patient started 
to have tachycardia with heart rate in 110s.  Further recommendation was 
obtained, will decrease hydralazine to 25 mg 3 times a day and start metoprolol 
25 mg 2 times a day. 
-He recommended monitoring blood pressure without aggressive action
-Echocardiogram
 CONCLUSIONS 
 Normal left ventricular size, wall thickness and systolic function  
 with no obvious regional wall motion abnormalities. 
 The ejection fraction is visually estimated at  >65   %. 
 Abnormal relaxation filling pattern of the left ventricle for age  
 (stage 1 diastolic dysfunction). 
 The left atrium is normal in size. 
 The mitral valve is normal in structure and function. 
 Focal thickening of the aortic valve cusps. 
 No aortic stenosis. 
 No aortic regurgitation. 
 Unable to estimate the right ventricular systolic pressure. 
 
 
 
-Continue home medication midodrine 2.5 mg 3 times a day and pravastatin 40 mg 
daily
-DVT prophylaxis Lovenox
-Diet Honey/ground soft
-Code full code
Patient is for discharge today
 
Problem List:
 1. Aspiration pneumonia
 
Pain Ratin
Pain Location:
N/A
Pain Goal: Pain 4 or less
Pain Plan:
See medication 
Tomorrow's Labs & Rationales:
N/A
 
 
Evelyne DOWNING,Priti 18 1237:
Attending MD Review Statement
 
Attending Statement
Attending MD Statement: examined this patient, discuss w/resident/PA/NP, agreed 
w/resident/PA/NP, reviewed EMR data (avail), discussed with nursing, discussed 
with case mgmt, amended to note
Attending Assessment/Plan:
Patient seen and examined.  Resting comfortably and not in any acute distress.  
No issues overnight.  Denies chest pain or shortness of breath.  Denies cough.  
He was saturating 94% on room air at rest and 91-92% on room air with 
ambulation.  Denies shortness of breath or palpitations.  He however was noted 
to have resting tachycardia 110 but increased to 130 when ambulating.  Patient 
reports that his cardiologist is aware that he does have a resting tachycardia. 
Case was discussed further with his cardiologist service.  Recommendations are 
to decrease the dose of hydralazine and begin patient on metoprolol 25 mg twice 
daily.  Patient received first dose of metoprolol today and was reevaluated.  
Resting heart rate was in the 80s with ambulation heart rate peaked in the 110s.
 Heart rate returned to the 90s with rest.
 
General appearance: Not in acute distress, knee
Heart: S1-S2 regular
Lungs: Good entry bilaterally, clear to auscultation
Abdomen: Soft, nontender with normal bowel sounds 
Extremities: No pedal edema
Skin: Intact without rashes
 
 
Recommendations:
-Patient is eager to be discharged home today.  He was seen by the GI service 
and recommendations were to perform an EGD with possible biopsy however he has 
declined to undergo this procedure during this hospitalization and would like to
follow-up with the gastroenterology service as an outpatient.
-Diet has been modified to ground on nectar thick liquid.  Patient and wife 
verbalized understanding of these recommendations.  He verbalized understanding 
that he is to be maintained on this regimen at all times.  We have also been 
instructed on swallow exercises.
-He will be discharged on a course of Augmentin to complete a total of 10 days 
of antibiotic therapy.  
-He will be discharged on metoprolol and hydralazine for blood pressure control.
 He is also to continue on his midodrine.
-He is medically stable to be discharged home today.

## 2018-01-11 NOTE — DISCHARGE SUMMARY
Visit Information
 
Visit Dates
Admission Date:
01/09/18
 
Discharge Date:
1/11/18
 
 
Hospital Course
 
Course
Attending Physician:
Priti Stubbs MD
 
Primary Care Physician:
Mika DOWNING,[facility] Course:
Mr. Bosworth is 67 year old male with past medical history significant for  
labile blood pressure, coronary artery disease with history of coronary stenosis
and right carotid stenosis, prostate cancer status post prostatectomy, throat 
and tongue cancer status post radiation and chemotherapy 2010 complicated by 
esophageal dysfunction had multiple esophageal dilatation procedure (Dr. Ramos (GI) 3 esophageal dilatations) last one July 2017 who presented to ED 
with chief complaint of hypoxia as he was found saturating in 80s at PCP office 
and referred to ED for evaluation.  Patient reported history of productive cough
white/yellow sputum started since November 2017 that failed outpatient 
management, he denied fever, chills, shortness of breath even at his PCP visit 
despite hypoxia, chest pain or palpitation.
 
On admission
Vital signs temperature 97.5, heart rate 124, blood pressure 161/95, respiratory
18 and saturating 87% on room air, 95% on 2 L
Labs significant for leukocytosis with 11 bands and left shift, H&H 12.9/40.2, 
platelet 339, sodium 142, potassium 4.6, chloride 100, bicarbonate 31, BUN 26 
and creatinine 1.1, lactic acid 1.9, troponin less than 0.01, albumin 3.1
 
CTA 
1. No pulmonary embolism.
2. Findings are consistent with infectious bronchiolitis, bronchopneumonia
and associated hilar and mediastinal lymphadenopathy. Query whether patient
is clinically at risk for aspiration.
3. Trace right pleural effusion.
 
EKG sinus tachycardia, heart rate 122, T-wave inversion in V2
 
Problem list
#Aspiration pneumonia/pneumonitis in sitting of chronic dysphagia as a result of
dysphagia stricture
#Acute hypoxic respiratory failure
#Sinus tachycardia could be stress induced versus hypoxia
#Hypertension
#History of coronary artery disease
 
Plan
#Aspiration pneumonia/pneumonitis
#Acute hypoxic respiratory failure 
-In sitting of chronic dysphagia as a result of dysphagia stricture, patient 
continued to be afebrile with improvement in leukocytosis, on discharge it was 
8.7.  Patient was initially given 1 dose of ceftriaxone and azithromycin in ED, 
switched to IV Unasyn during his hospital stay, and was discharged on by mouth 
Augmentin to finish total of 10 days antibiotic.  Patient was maintained on 
Protonix 40 daily, Robitussin with codeine every afternoon.  Sputum culture 
positive for yeast mostly contamination no bacterial overgrowth.
-Patient was started on oxygen via nasal cannula on admission as he was found 
sating 87% on room air however on day of discharge pulse ox on rest and 
ambulation yealded 93% on RA.
-Pulmonary consultation was obtained, thanks the recommendation, patient should 
follow up as an outpatient for a CAT scan and pulmonary function test.
-Modified barium swallow and speech evaluations were obtained during 
hospitalization.
FINDINGS:
The examination was performed with the speech pathologist present. The
patient took several consistencies of barium without difficulty. Laryngeal
penetration was noted as well as one episode of aspiration with coughing.
 
IMPRESSION:
Laryngeal penetration as well as aspiration were noted. 
 
Speech therapist discussed with the patient the option of tube feed however he 
totally refused that and wants to eat drink by mouth.  He reported that he is 
open for diet modification for maximum safety.  Speech therapy recommended 
ground soft and honey nectar thick.  Patient tolerated diet well and understand 
the risk of his choices.  He is willing to follow up with speech therapy for 
swallowing exercise, ENT and GI for recommendation.  Aspiration precaution was 
discussed in details with the patient and his family by speech therapist and 
house staff.
 
-GI consultation was obtained as well with recommendation for endoscopy with 
biopsies however patient refused and wanted to have it as an outpatient with his
regular gastroenterologist Dr. Ramos next week.
 
#Sinus tachycardia 
-On admission patient was found to be have sinus tachycardia of 124, could be 
stress induced versus hypoxia versus infection that improved after admission 
however after starting hydralazine patient started again to have tachycardia.  
Please see below. 
-TSH slightly elevated 4.24, within normal 3 T4 mostly represents sick sinus 
syndrome
-CTA negative for PE
 
#Labile Hypertension
-Fluctuating blood pressure however elevated since admission. Spoke with 
cardiologist at [place] Dr. Dukes at [phone], he recommended continuing 
midodrine and adding hydralazine 60 mg 3 times a day. BP improved after starting
hydralazine however he developed tachycardia with heart rate in 110s.  Further 
recommendation was obtained, hydralazine was decreased to 25 mg 3 times a day 
and start metoprolol 25 mg 2 times a day. BP and HR improved. on discharge BP 
was 112/60 and heart rate 89 regular.
 
-Echocardiogram
 CONCLUSIONS 
 Normal left ventricular size, wall thickness and systolic function  
 with no obvious regional wall motion abnormalities. 
 The ejection fraction is visually estimated at  >65   %. 
 Abnormal relaxation filling pattern of the left ventricle for age  
 (stage 1 diastolic dysfunction). 
 The left atrium is normal in size. 
 The mitral valve is normal in structure and function. 
 Focal thickening of the aortic valve cusps. 
 No aortic stenosis. 
 No aortic regurgitation. 
 Unable to estimate the right ventricular systolic pressure. 
 
 
-DVT prophylaxis Lovenox
-Diet Honey/ground soft
-Code full code
 
 
 
 
Allergies:
Coded Allergies:
NO KNOWN ALLERGIES (02/06/15)
 
 
Disposition Summary
 
Disposition
Principal Diagnosis:
Aspiration pneumonia
Additional Diagnosis:
Acut hypoxic respiratory failure 
Discharge Disposition: home or self care
 
Discharge Instructions
 
General Discharge Information
Code Status: Full Code
Patient's Diet:
Soft ground and nector thick 
Patient's Activity:
as tolerated 
Follow-Up Instructions/Appts:
-PLEASE FOLLOW UP WITH PCP AFTER DISCHARGE
-PLEASE FOLLOW UP WITH CARDIOLOGY AFTER DISCHARGE
-PLEASE FOLLOW UP WITH GI DR. RAMOS AFTER DISCHARGE FOR ENDOSCOPY
-PLEASE FOLLOW UP WITH SPEECH THERAPY AFTER DISCHARGE MAINWHILE CONTINUE THE 
RECOMMENDED DIET
-PLEASE FOLLOW UP WITH ENT AFTER DISCHARGE
 
 
Medications at Discharge
Discharge Medications:
Continue taking these medications:
Midodrine HCl (Midodrine HCl) 2.5 MG TABLET
    1 Tablet ORAL THREE TIMES DAILY
    Qty = 90
    Comments:
       Last Taken: 1/11/18
             Time: 11:05 AM
 
Pravastatin Sodium (Pravastatin Sodium) 40 MG TABLET
    1 Tablet ORAL Every night
    Qty = 90
    Comments:
       Last Taken: 1/10/18
             Time: 4:15 PM
 
Pantoprazole Sodium (Pantoprazole Sodium) 40 MG TABLET.DR
    1 Tablet ORAL DAILY
    Comments:
       Last Taken: NOT GIVEN IN HOSPITAL
             Time:
 
Start taking the following new medications:
Amoxicillin/Potassium Clav (Augmentin 875-125 Tablet) 875 MG-125 MG TABLET
    1 Tablet ORAL TWICE DAILY
    Qty = 15
    No Refills
    Instructions:
       .
    Comments:
       Last Taken: NOT GIVEN IN HOSPITAL (RECEVIED IV ANTIBITOICS)
             Time:
 
Hydralazine HCl (Hydralazine HCl) 25 MG TABLET
    1 Tablet ORAL THREE TIMES DAILY
    Qty = 90
    No Refills
    Instructions:
       .
    Comments:
       Last Taken: 1/11/18
             Time: 11:05 AM
 
Metoprolol Tartrate (Metoprolol Tartrate) 25 MG TABLET
    1 Tablet ORAL TWICE DAILY
    Qty = 60
    No Refills
    Instructions:
       .
    Comments:
       Last Taken: 1/11/18
             Time: 11:05 AM
 
 
Copies To:
Sonny DOWNING,Javier GREENFIELD; Jarrell DOWNING,Kike JIMÉNEZ; Tyron DOWNING,Krzysztof SILVA; Mika DOWNING,Hannah; Richard DOWNING,Mingo MEJIA
 
Attending MD Review Statement
Documenting Attending:
Priti Stubbs MD
Other Findings:
Discharged in stable condition